# Patient Record
Sex: MALE | Employment: OTHER | URBAN - METROPOLITAN AREA
[De-identification: names, ages, dates, MRNs, and addresses within clinical notes are randomized per-mention and may not be internally consistent; named-entity substitution may affect disease eponyms.]

---

## 2017-04-15 ENCOUNTER — HOSPITAL ENCOUNTER (INPATIENT)
Age: 82
LOS: 5 days | Discharge: HOSPICE | DRG: 436 | End: 2017-04-20
Attending: HOSPITALIST | Admitting: INTERNAL MEDICINE
Payer: MEDICARE

## 2017-04-15 ENCOUNTER — APPOINTMENT (OUTPATIENT)
Dept: CT IMAGING | Age: 82
DRG: 436 | End: 2017-04-15
Attending: INTERNAL MEDICINE
Payer: MEDICARE

## 2017-04-15 DIAGNOSIS — C78.7 METASTATIC ADENOCARCINOMA TO LIVER (HCC): ICD-10-CM

## 2017-04-15 DIAGNOSIS — R16.0 LIVER MASSES: ICD-10-CM

## 2017-04-15 PROBLEM — O22.30 DVT (DEEP VEIN THROMBOSIS) IN PREGNANCY: Chronic | Status: ACTIVE | Noted: 2017-04-15

## 2017-04-15 PROBLEM — I26.99 PULMONARY EMBOLISM (HCC): Chronic | Status: ACTIVE | Noted: 2017-04-15

## 2017-04-15 PROBLEM — I10 HTN (HYPERTENSION): Chronic | Status: ACTIVE | Noted: 2017-04-15

## 2017-04-15 PROBLEM — N40.0 BPH (BENIGN PROSTATIC HYPERPLASIA): Chronic | Status: ACTIVE | Noted: 2017-04-15

## 2017-04-15 PROBLEM — I48.91 A-FIB (HCC): Chronic | Status: ACTIVE | Noted: 2017-04-15

## 2017-04-15 PROBLEM — C80.1 CANCER (HCC): Status: ACTIVE | Noted: 2017-04-15

## 2017-04-15 LAB
ALBUMIN SERPL BCP-MCNC: 2.4 G/DL (ref 3.5–5)
ALBUMIN/GLOB SERPL: 0.7 {RATIO} (ref 1.1–2.2)
ALP SERPL-CCNC: 696 U/L (ref 45–117)
ALT SERPL-CCNC: 40 U/L (ref 12–78)
ANION GAP BLD CALC-SCNC: 7 MMOL/L (ref 5–15)
AST SERPL W P-5'-P-CCNC: 77 U/L (ref 15–37)
BASOPHILS # BLD AUTO: 0 K/UL (ref 0–0.1)
BASOPHILS # BLD: 0 % (ref 0–1)
BILIRUB SERPL-MCNC: 0.4 MG/DL (ref 0.2–1)
BUN SERPL-MCNC: 44 MG/DL (ref 6–20)
BUN/CREAT SERPL: 36 (ref 12–20)
CALCIUM SERPL-MCNC: 9.2 MG/DL (ref 8.5–10.1)
CHLORIDE SERPL-SCNC: 104 MMOL/L (ref 97–108)
CO2 SERPL-SCNC: 24 MMOL/L (ref 21–32)
CREAT SERPL-MCNC: 1.23 MG/DL (ref 0.7–1.3)
EOSINOPHIL # BLD: 0.2 K/UL (ref 0–0.4)
EOSINOPHIL NFR BLD: 1 % (ref 0–7)
ERYTHROCYTE [DISTWIDTH] IN BLOOD BY AUTOMATED COUNT: 17.6 % (ref 11.5–14.5)
GLOBULIN SER CALC-MCNC: 3.3 G/DL (ref 2–4)
GLUCOSE SERPL-MCNC: 112 MG/DL (ref 65–100)
HCT VFR BLD AUTO: 28.1 % (ref 36.6–50.3)
HGB BLD-MCNC: 8.8 G/DL (ref 12.1–17)
INR PPP: 1.9 (ref 0.9–1.1)
LYMPHOCYTES # BLD AUTO: 12 % (ref 12–49)
LYMPHOCYTES # BLD: 2.3 K/UL (ref 0.8–3.5)
MCH RBC QN AUTO: 23.8 PG (ref 26–34)
MCHC RBC AUTO-ENTMCNC: 31.3 G/DL (ref 30–36.5)
MCV RBC AUTO: 75.9 FL (ref 80–99)
MONOCYTES # BLD: 0.6 K/UL (ref 0–1)
MONOCYTES NFR BLD AUTO: 3 % (ref 5–13)
NEUTS SEG # BLD: 16.6 K/UL (ref 1.8–8)
NEUTS SEG NFR BLD AUTO: 84 % (ref 32–75)
PLATELET # BLD AUTO: 451 K/UL (ref 150–400)
POTASSIUM SERPL-SCNC: 5.2 MMOL/L (ref 3.5–5.1)
PROT SERPL-MCNC: 5.7 G/DL (ref 6.4–8.2)
PROTHROMBIN TIME: 19.4 SEC (ref 9–11.1)
RBC # BLD AUTO: 3.7 M/UL (ref 4.1–5.7)
SODIUM SERPL-SCNC: 135 MMOL/L (ref 136–145)
WBC # BLD AUTO: 19.8 K/UL (ref 4.1–11.1)

## 2017-04-15 PROCEDURE — 65660000000 HC RM CCU STEPDOWN

## 2017-04-15 PROCEDURE — 80053 COMPREHEN METABOLIC PANEL: CPT | Performed by: INTERNAL MEDICINE

## 2017-04-15 PROCEDURE — 0T9B70Z DRAINAGE OF BLADDER WITH DRAINAGE DEVICE, VIA NATURAL OR ARTIFICIAL OPENING: ICD-10-PCS | Performed by: HOSPITALIST

## 2017-04-15 PROCEDURE — 71260 CT THORAX DX C+: CPT

## 2017-04-15 PROCEDURE — 93005 ELECTROCARDIOGRAM TRACING: CPT

## 2017-04-15 PROCEDURE — 36415 COLL VENOUS BLD VENIPUNCTURE: CPT | Performed by: INTERNAL MEDICINE

## 2017-04-15 PROCEDURE — 85610 PROTHROMBIN TIME: CPT | Performed by: INTERNAL MEDICINE

## 2017-04-15 PROCEDURE — 74011250636 HC RX REV CODE- 250/636: Performed by: INTERNAL MEDICINE

## 2017-04-15 PROCEDURE — 74011250637 HC RX REV CODE- 250/637: Performed by: INTERNAL MEDICINE

## 2017-04-15 PROCEDURE — 85025 COMPLETE CBC W/AUTO DIFF WBC: CPT | Performed by: INTERNAL MEDICINE

## 2017-04-15 RX ORDER — ACETAMINOPHEN 325 MG/1
650 TABLET ORAL
Status: DISCONTINUED | OUTPATIENT
Start: 2017-04-15 | End: 2017-04-20 | Stop reason: HOSPADM

## 2017-04-15 RX ORDER — IPRATROPIUM BROMIDE AND ALBUTEROL SULFATE 2.5; .5 MG/3ML; MG/3ML
3 SOLUTION RESPIRATORY (INHALATION)
COMMUNITY

## 2017-04-15 RX ORDER — OMEPRAZOLE 20 MG/1
20 CAPSULE, DELAYED RELEASE ORAL DAILY
Status: DISCONTINUED | OUTPATIENT
Start: 2017-04-16 | End: 2017-04-15 | Stop reason: CLARIF

## 2017-04-15 RX ORDER — LANOLIN ALCOHOL/MO/W.PET/CERES
325 CREAM (GRAM) TOPICAL
COMMUNITY

## 2017-04-15 RX ORDER — DOXAZOSIN 2 MG/1
4 TABLET ORAL DAILY
Status: DISCONTINUED | OUTPATIENT
Start: 2017-04-16 | End: 2017-04-20 | Stop reason: HOSPADM

## 2017-04-15 RX ORDER — SODIUM CHLORIDE 0.9 % (FLUSH) 0.9 %
5-10 SYRINGE (ML) INJECTION AS NEEDED
Status: DISCONTINUED | OUTPATIENT
Start: 2017-04-15 | End: 2017-04-20 | Stop reason: HOSPADM

## 2017-04-15 RX ORDER — METHOTREXATE 2.5 MG/1
10 TABLET ORAL
Status: DISCONTINUED | OUTPATIENT
Start: 2017-04-17 | End: 2017-04-20 | Stop reason: HOSPADM

## 2017-04-15 RX ORDER — DOXAZOSIN 4 MG/1
4 TABLET ORAL DAILY
COMMUNITY

## 2017-04-15 RX ORDER — METOPROLOL TARTRATE 5 MG/5ML
5 INJECTION INTRAVENOUS
Status: DISCONTINUED | OUTPATIENT
Start: 2017-04-15 | End: 2017-04-20 | Stop reason: HOSPADM

## 2017-04-15 RX ORDER — METOPROLOL TARTRATE 50 MG/1
50 TABLET ORAL 2 TIMES DAILY
Status: DISCONTINUED | OUTPATIENT
Start: 2017-04-15 | End: 2017-04-20 | Stop reason: HOSPADM

## 2017-04-15 RX ORDER — MELATONIN
1000 DAILY
COMMUNITY

## 2017-04-15 RX ORDER — BUDESONIDE 0.5 MG/2ML
500 INHALANT ORAL
Status: DISCONTINUED | OUTPATIENT
Start: 2017-04-15 | End: 2017-04-20 | Stop reason: HOSPADM

## 2017-04-15 RX ORDER — FINASTERIDE 5 MG/1
5 TABLET, FILM COATED ORAL DAILY
COMMUNITY

## 2017-04-15 RX ORDER — GUAIFENESIN 600 MG/1
600 TABLET, EXTENDED RELEASE ORAL 2 TIMES DAILY
Status: DISCONTINUED | OUTPATIENT
Start: 2017-04-15 | End: 2017-04-20 | Stop reason: HOSPADM

## 2017-04-15 RX ORDER — OMEPRAZOLE 20 MG/1
20 CAPSULE, DELAYED RELEASE ORAL DAILY
COMMUNITY

## 2017-04-15 RX ORDER — SODIUM CHLORIDE 0.9 % (FLUSH) 0.9 %
5-10 SYRINGE (ML) INJECTION EVERY 8 HOURS
Status: DISCONTINUED | OUTPATIENT
Start: 2017-04-15 | End: 2017-04-20 | Stop reason: HOSPADM

## 2017-04-15 RX ORDER — FOLIC ACID 1 MG/1
1 TABLET ORAL DAILY
COMMUNITY

## 2017-04-15 RX ORDER — FINASTERIDE 5 MG/1
5 TABLET, FILM COATED ORAL DAILY
Status: DISCONTINUED | OUTPATIENT
Start: 2017-04-16 | End: 2017-04-20 | Stop reason: HOSPADM

## 2017-04-15 RX ORDER — LANOLIN ALCOHOL/MO/W.PET/CERES
325 CREAM (GRAM) TOPICAL
Status: DISCONTINUED | OUTPATIENT
Start: 2017-04-16 | End: 2017-04-20 | Stop reason: HOSPADM

## 2017-04-15 RX ORDER — FLUTICASONE PROPIONATE AND SALMETEROL 500; 50 UG/1; UG/1
1 POWDER RESPIRATORY (INHALATION) EVERY 12 HOURS
COMMUNITY

## 2017-04-15 RX ORDER — ONDANSETRON 2 MG/ML
4 INJECTION INTRAMUSCULAR; INTRAVENOUS
Status: DISCONTINUED | OUTPATIENT
Start: 2017-04-15 | End: 2017-04-20 | Stop reason: HOSPADM

## 2017-04-15 RX ORDER — METOPROLOL TARTRATE 50 MG/1
50 TABLET ORAL 2 TIMES DAILY
COMMUNITY

## 2017-04-15 RX ORDER — FOLIC ACID 1 MG/1
1 TABLET ORAL DAILY
Status: DISCONTINUED | OUTPATIENT
Start: 2017-04-16 | End: 2017-04-20 | Stop reason: HOSPADM

## 2017-04-15 RX ORDER — PANTOPRAZOLE SODIUM 40 MG/1
40 TABLET, DELAYED RELEASE ORAL
Status: DISCONTINUED | OUTPATIENT
Start: 2017-04-16 | End: 2017-04-20 | Stop reason: HOSPADM

## 2017-04-15 RX ORDER — SPIRONOLACTONE 100 MG/1
100 TABLET, FILM COATED ORAL DAILY
COMMUNITY

## 2017-04-15 RX ORDER — LANOLIN ALCOHOL/MO/W.PET/CERES
400 CREAM (GRAM) TOPICAL DAILY
Status: ON HOLD | COMMUNITY
End: 2017-04-15

## 2017-04-15 RX ORDER — LOSARTAN POTASSIUM 50 MG/1
50 TABLET ORAL DAILY
COMMUNITY

## 2017-04-15 RX ORDER — ALBUTEROL SULFATE 0.83 MG/ML
2.5 SOLUTION RESPIRATORY (INHALATION)
Status: DISCONTINUED | OUTPATIENT
Start: 2017-04-15 | End: 2017-04-17 | Stop reason: SDUPTHER

## 2017-04-15 RX ORDER — MELATONIN
1000 DAILY
Status: DISCONTINUED | OUTPATIENT
Start: 2017-04-16 | End: 2017-04-20 | Stop reason: HOSPADM

## 2017-04-15 RX ORDER — SODIUM CHLORIDE 9 MG/ML
75 INJECTION, SOLUTION INTRAVENOUS CONTINUOUS
Status: DISCONTINUED | OUTPATIENT
Start: 2017-04-15 | End: 2017-04-20 | Stop reason: HOSPADM

## 2017-04-15 RX ORDER — LOSARTAN POTASSIUM 50 MG/1
50 TABLET ORAL DAILY
Status: DISCONTINUED | OUTPATIENT
Start: 2017-04-16 | End: 2017-04-16

## 2017-04-15 RX ORDER — FLUTICASONE PROPIONATE AND SALMETEROL 500; 50 UG/1; UG/1
1 POWDER RESPIRATORY (INHALATION) EVERY 12 HOURS
Status: DISCONTINUED | OUTPATIENT
Start: 2017-04-15 | End: 2017-04-15 | Stop reason: CLARIF

## 2017-04-15 RX ORDER — HYDROCHLOROTHIAZIDE 25 MG/1
25 TABLET ORAL DAILY
COMMUNITY

## 2017-04-15 RX ORDER — ARFORMOTEROL TARTRATE 15 UG/2ML
15 SOLUTION RESPIRATORY (INHALATION)
Status: DISCONTINUED | OUTPATIENT
Start: 2017-04-15 | End: 2017-04-20 | Stop reason: HOSPADM

## 2017-04-15 RX ORDER — METHOTREXATE 2.5 MG/1
10 TABLET ORAL
COMMUNITY

## 2017-04-15 RX ADMIN — Medication 10 ML: at 18:31

## 2017-04-15 RX ADMIN — GUAIFENESIN 600 MG: 600 TABLET, EXTENDED RELEASE ORAL at 20:01

## 2017-04-15 RX ADMIN — Medication 10 ML: at 22:09

## 2017-04-15 RX ADMIN — SODIUM CHLORIDE 75 ML/HR: 900 INJECTION, SOLUTION INTRAVENOUS at 18:31

## 2017-04-15 NOTE — PROGRESS NOTES
Admission Medication Reconciliation:    Information obtained from: medical record from [address] [place] [address]    Significant PMH/Disease States:   No past medical history on file. Chief Complaint for this Admission:  cancer    Allergies:  Review of patient's allergies indicates no known allergies. Prior to Admission Medications:   Prior to Admission Medications   Prescriptions Last Dose Informant Patient Reported? Taking? albuterol-ipratropium (DUONEB) 2.5 mg-0.5 mg/3 ml nebu Unknown at Unknown time  Yes No   Sig: 3 mL by Nebulization route every four (4) hours as needed. cholecalciferol (VITAMIN D3) 1,000 unit tablet 4/15/2017 at Unknown time  Yes Yes   Sig: Take 1,000 Units by mouth daily. doxazosin (CARDURA) 4 mg tablet 4/15/2017 at Unknown time  Yes Yes   Sig: Take 4 mg by mouth daily. ferrous sulfate 325 mg (65 mg iron) tablet 4/15/2017 at Unknown time  Yes Yes   Sig: Take 325 mg by mouth Daily (before breakfast). finasteride (PROSCAR) 5 mg tablet 4/15/2017 at Unknown time  Yes Yes   Sig: Take 5 mg by mouth daily. fish oil-omega-3 fatty acids 340-1,000 mg capsule 4/15/2017 at Unknown time  Yes Yes   Sig: Take 3 Caps by mouth daily. fluticasone-salmeterol (ADVAIR DISKUS) 500-50 mcg/dose diskus inhaler 4/15/2017 at Unknown time  Yes Yes   Sig: Take 1 Puff by inhalation every twelve (12) hours. folic acid (FOLVITE) 1 mg tablet 4/15/2017 at Unknown time  Yes Yes   Sig: Take 1 mg by mouth daily. guaiFENesin SR (MUCINEX) 600 mg SR tablet 4/15/2017 at Unknown time  Yes Yes   Sig: Take 600 mg by mouth two (2) times a day. hydroCHLOROthiazide (HYDRODIURIL) 25 mg tablet 4/15/2017 at Unknown time  Yes Yes   Sig: Take 25 mg by mouth daily. losartan (COZAAR) 50 mg tablet 4/15/2017 at Unknown time  Yes Yes   Sig: Take 50 mg by mouth daily. methotrexate (RHEUMATREX) 2.5 mg tablet 4/15/2017 at Unknown time  Yes Yes   Sig: Take 10 mg by mouth every Monday.    metoprolol tartrate (LOPRESSOR) 50 mg tablet 4/15/2017 at Unknown time  Yes Yes   Sig: Take 50 mg by mouth two (2) times a day. multivitamin, tx-iron-ca-min (THERA-M W/ IRON) 9 mg iron-400 mcg tab tablet 4/15/2017 at Unknown time  Yes Yes   Sig: Take 1 Tab by mouth daily. omeprazole (PRILOSEC) 20 mg capsule 4/15/2017 at Unknown time  Yes Yes   Sig: Take 20 mg by mouth daily. rivaroxaban (XARELTO) 20 mg tab tablet 4/15/2017 at Unknown time  Yes Yes   Sig: Take 20 mg by mouth daily (with dinner). spironolactone (ALDACTONE) 100 mg tablet 4/15/2017 at Unknown time  Yes Yes   Sig: Take 100 mg by mouth daily. Facility-Administered Medications: None         Comments/Recommendations: Medication review done with medical record from [address] [place] [address] dated 4/15/2017 and with RX query to verify dose of Advair. Regimen verified by RN with patient.   Added all present medications to pta med list.

## 2017-04-15 NOTE — H&P
History & Physical    Primary Care Provider: Ford Rousseau MD  Source of Information: Patient and daughter    History of Presenting Illness:   Milena Álvarez is a 80 y.o. male with medical history significant for DVT/PE, RA, HTN, BPH and BLE edema who presents in transfer from [place] due to abnormal findings on a CT abdomen/pelvis. Patient states that for the past 5-6 weeks, he has been having abdominal distension and dyspnea. No associated nausea or abdominal pain. Positive for emesis x 1 recently. Having normal bowel movements. Positive for significant unintentional weight loss. Regarding dyspnea, positive with activity and with excessive speech. No productive cough or chest pain. Negative additionally for fever, chills, congestion, recent illness, palpitations, diarrhea , headaches, visual disturbances, ataxia, or dysuria. Due to symptoms he presented to [facility]. Vitals and labs wnl. CT of the abdomen done due to significant distention and positive findings of numerous large solid masses scattered throughout the liver suspicious for hepatic metastatic disease. Transferred for additional workup and management. 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 RA on methotrexate  3. HTN  4. BPH  5. [facility] post flu shot  6. Prior tobacco use--quit 1969  7. BLE edema on spironolactone and HCTZ  8. Afib on xarelto    No past surgical history on file. Prior to Admission medications    Medication Sig Start Date End Date Taking? Authorizing Provider   multivitamin, tx-iron-ca-min (THERA-M W/ IRON) 9 mg iron-400 mcg tab tablet Take 1 Tab by mouth daily. Yes Historical Provider   doxazosin (CARDURA) 4 mg tablet Take 4 mg by mouth daily.    Yes Historical Provider   finasteride (PROSCAR) 5 mg tablet Take 5 mg by mouth daily. Yes Historical Provider   fluticasone-salmeterol (ADVAIR DISKUS) 500-50 mcg/dose diskus inhaler Take 1 Puff by inhalation every twelve (12) hours. Yes Historical Provider   guaiFENesin SR (MUCINEX) 600 mg SR tablet Take 600 mg by mouth two (2) times a day. Yes Historical Provider   hydroCHLOROthiazide (HYDRODIURIL) 25 mg tablet Take 25 mg by mouth daily. Yes Historical Provider   ferrous sulfate 325 mg (65 mg iron) tablet Take 325 mg by mouth Daily (before breakfast). Yes Historical Provider   losartan (COZAAR) 50 mg tablet Take 50 mg by mouth daily. Yes Historical Provider   methotrexate (RHEUMATREX) 2.5 mg tablet Take 10 mg by mouth every Monday. Yes Historical Provider   metoprolol tartrate (LOPRESSOR) 50 mg tablet Take 50 mg by mouth two (2) times a day. Yes Historical Provider   fish oil-omega-3 fatty acids 340-1,000 mg capsule Take 3 Caps by mouth daily. Yes Historical Provider   omeprazole (PRILOSEC) 20 mg capsule Take 20 mg by mouth daily. Yes Historical Provider   folic acid (FOLVITE) 1 mg tablet Take 1 mg by mouth daily. Yes Historical Provider   spironolactone (ALDACTONE) 100 mg tablet Take 100 mg by mouth daily. Yes Historical Provider   cholecalciferol (VITAMIN D3) 1,000 unit tablet Take 1,000 Units by mouth daily. Yes Historical Provider   rivaroxaban (XARELTO) 20 mg tab tablet Take 20 mg by mouth daily (with dinner). Yes Historical Provider   albuterol-ipratropium (DUONEB) 2.5 mg-0.5 mg/3 ml nebu 3 mL by Nebulization route every four (4) hours as needed.     Historical Provider     Allergies:  NKDA    FHX:  Mother-- of cancer--possible stomach/GI  Father-- of lung cancer  Brother-- of GI cancer--possible intestinal     SOCIAL HISTORY:  Patient resides:  Independently x   Assisted Living    SNF    With family care       Smoking history:   None    Former x   Chronic      Alcohol history: Rare None    Social    Chronic      Ambulates:   Independently x   w/cane    w/walker    w/wc    CODE STATUS:  DNR x   Full    Other      Objective:     Physical Exam:     Visit Vitals    BP 96/53 (BP 1 Location: Right arm, BP Patient Position: At rest;Supine)    Pulse 98    Temp 97.9 °F (36.6 °C)    Resp 22    Wt 77.9 kg (171 lb 11.8 oz)    SpO2 98%      O2 Device: Room air    General:  Alert, cooperative, no distress, appears stated age. Head:  Normocephalic, without obvious abnormality, atraumatic. Eyes:  Conjunctivae/corneas clear. EOMs intact. Nose: Nares normal. Septum midline. Throat: Lips, mucosa, and tongue normal. .   Neck: Supple, symmetrical, trachea midline, no JVD. Lungs:   Clear to auscultation bilaterally. No w/r/r. Heart:  Irregularly irregular, S1, S2 normal, positive BLE 1+ pitting edema to the thighs   Abdomen:   Soft, non-tender. Distended. Bowel sounds normal. Positive hepatomegaly. Extremities: Extremities atraumatic and without cyanosis. RLE with healing ulcer on shin. Pulses: 2+ and symmetric BUE   Skin: Skin color, texture, turgor normal. No rashes   Neurologic: CNII-XII intact. EKG:  pending      Data Review:     Recent Days:  No results for input(s): WBC, HGB, HCT, PLT, HGBEXT, HCTEXT, PLTEXT in the last 72 hours. No results for input(s): NA, K, CL, CO2, GLU, BUN, CREA, CA, MG, PHOS, ALB, TBIL, SGOT, ALT, INR in the last 72 hours. No lab exists for component: INREXT  No results for input(s): PH, PCO2, PO2, HCO3, FIO2 in the last 72 hours. 24 Hour Results:  No results found for this or any previous visit (from the past 24 hour(s)). Imaging:  [facility]:  Numerous large solid masses scattered throughout the liver most suspicious for hepatic metastatic disease. Extensive soft tissue nodularity throughout the omentum and anterior peritoneal surface most compatible with extensive peritoneal carcinomatosis.   Masslike complex fluid collection/rind surrounding the stomach results in external compression concerning for further peritoneal carcinomatosis, potentially of mucinous origin. Assessment:     Active Problems:    Cancer ([facility] [address] (4/15/2017)           Plan:     79 y/o male with:  1. Presumed metastatic cancer, unknown primary  --CT abdomen/pelvis as above  --Possible biliary tumor primary? --Will obtain CT chest   --Hold on CT/MRI brain for now  --Hold on oncology consult until tissue obtained  --IR consult for biopsy Monday   --Of note, patient on xarelto 20 mg daily for afib-->will hold in order to have biopsy-->risk of holding discussed with patient and daughter. Both understand. 2. Afib  --Continue home metoprolol   --Metoprolol 5 mg IV Q6 hours PRN HR > 120    3. DVT/PE  --Last in [facility] [address] duplex--if DVT noted, will need lovenox/heparin bridge while holding xarelto for biopsy    4. BPH  --Continue home   --Finasteride and cardura as tolerated  --Teague placed for urinary retention of 1 L    5. RA  --Continue methotrexate    6. HTN  --Continue home medications with parameters  --Hypotensive on admission tonight but asymptomatic-->start IVF and hold spironolactone and HCTZ    7.  GERD  --Continue PPI    DVT prophylaxis: STEPHANIE hose and heparin SQ--await results of duplex  Code Status: DNR/I         Signed By: Goyo Rodriguez, DO     April 15, 2017

## 2017-04-15 NOTE — PROGRESS NOTES
04/15/17 1900   Vital Signs   Temp 98 °F (36.7 °C)   Temp Source Oral   Pulse (Heart Rate) 97   Heart Rate Source Monitor   Cardiac Rhythm A Fib   Resp Rate 21   O2 Sat (%) 97 %   Level of Consciousness Alert   BP 90/45   MAP (Calculated) (!) 60   BP 1 Method Automatic   BP 1 Location Right arm   BP Patient Position At rest   MEWS Score 3     MD aware; ekg and chest CT ordered; orders for metoprolol modified     Will continue to monitor pt

## 2017-04-15 NOTE — PROGRESS NOTES
Bedside shift change report given to ROME Devine (oncoming nurse) by Jaycob Leija (offgoing nurse).  Report included the following information SBAR, Kardex, Intake/Output, MAR, Recent Results and Cardiac Rhythm A Fib.   '

## 2017-04-15 NOTE — IP AVS SNAPSHOT
Current Discharge Medication List  
  
CONTINUE these medications which have NOT CHANGED Dose & Instructions Dispensing Information Comments Morning Noon Evening Bedtime ADVAIR DISKUS 500-50 mcg/dose diskus inhaler Generic drug:  fluticasone-salmeterol Your last dose was: Your next dose is:    
   
   
 Dose:  1 Puff Take 1 Puff by inhalation every twelve (12) hours. Refills:  0  
     
   
   
   
  
 CARDURA 4 mg tablet Generic drug:  doxazosin Your last dose was: Your next dose is:    
   
   
 Dose:  4 mg Take 4 mg by mouth daily. Refills:  0 DUONEB 2.5 mg-0.5 mg/3 ml Nebu Generic drug:  albuterol-ipratropium Your last dose was: Your next dose is:    
   
   
 Dose:  3 mL  
3 mL by Nebulization route every four (4) hours as needed. Refills:  0  
     
   
   
   
  
 ferrous sulfate 325 mg (65 mg iron) tablet Your last dose was: Your next dose is:    
   
   
 Dose:  325 mg Take 325 mg by mouth Daily (before breakfast). Refills:  0  
     
   
   
   
  
 finasteride 5 mg tablet Commonly known as:  PROSCAR Your last dose was: Your next dose is:    
   
   
 Dose:  5 mg Take 5 mg by mouth daily. Refills:  0  
     
   
   
   
  
 fish oil-omega-3 fatty acids 340-1,000 mg capsule Your last dose was: Your next dose is:    
   
   
 Dose:  3 Cap Take 3 Caps by mouth daily. Refills:  0  
     
   
   
   
  
 folic acid 1 mg tablet Commonly known as:  Delores Your last dose was: Your next dose is:    
   
   
 Dose:  1 mg Take 1 mg by mouth daily. Refills:  0  
     
   
   
   
  
 guaiFENesin  mg SR tablet Commonly known as:  Mingle[employer] & Alex Your last dose was: Your next dose is:    
   
   
 Dose:  600 mg Take 600 mg by mouth two (2) times a day. Refills:  0 hydroCHLOROthiazide 25 mg tablet Commonly known as:  HYDRODIURIL Your last dose was: Your next dose is:    
   
   
 Dose:  25 mg Take 25 mg by mouth daily. Refills:  0  
     
   
   
   
  
 losartan 50 mg tablet Commonly known as:  COZAAR Your last dose was: Your next dose is:    
   
   
 Dose:  50 mg Take 50 mg by mouth daily. Refills:  0  
     
   
   
   
  
 methotrexate 2.5 mg tablet Commonly known as:  [place] Your last dose was: Your next dose is:    
   
   
 Dose:  10 mg Take 10 mg by mouth every Monday. Refills:  0  
     
   
   
   
  
 metoprolol tartrate 50 mg tablet Commonly known as:  LOPRESSOR Your last dose was: Your next dose is:    
   
   
 Dose:  50 mg Take 50 mg by mouth two (2) times a day. Refills:  0  
     
   
   
   
  
 multivitamin, tx-iron-ca-min 9 mg iron-400 mcg Tab tablet Commonly known as:  THERA-M w/ IRON Your last dose was: Your next dose is:    
   
   
 Dose:  1 Tab Take 1 Tab by mouth daily. Refills:  0  
     
   
   
   
  
 omeprazole 20 mg capsule Commonly known as:  PRILOSEC Your last dose was: Your next dose is:    
   
   
 Dose:  20 mg Take 20 mg by mouth daily. Refills:  0  
     
   
   
   
  
 rivaroxaban 20 mg Tab tablet Commonly known as:  Babita Chroman Your last dose was: Your next dose is:    
   
   
 Dose:  20 mg Take 20 mg by mouth daily (with dinner). Refills:  0  
     
   
   
   
  
 spironolactone 100 mg tablet Commonly known as:  ALDACTONE Your last dose was: Your next dose is:    
   
   
 Dose:  100 mg Take 100 mg by mouth daily. Refills:  0  
     
   
   
   
  
 VITAMIN D3 1,000 unit tablet Generic drug:  cholecalciferol Your last dose was: Your next dose is:    
   
   
 Dose:  1000 Units Take 1,000 Units by mouth daily. Refills:  0

## 2017-04-15 NOTE — IP AVS SNAPSHOT
[address] 
[phone] Patient: Silvana Luke MRN: [id number] You are allergic to the following No active allergies Recent Documentation Height Weight BMI  
  
  
 1.651 m 82.7 kg 30.34 kg/m2 Emergency Contacts Name Discharge Info Relation Home Work Mobile Claudia Nobles DISCHARGE CAREGIVER [3] Child [2] [phone] Bonnie Meade  Daughter [21]   [phone] About your hospitalization You were admitted on:  April 15, 2017 You last received care in the:  [facility] 6S NEURO-SCI TELE You were discharged on:  2017 Unit phone number:  [phone] Why you were hospitalized Your primary diagnosis was:  Cancer (Hcc) Your diagnoses also included:  Dvt (Deep Vein Thrombosis) In Pregnancy (Hcc), A-Fib (Hcc), Htn (Hypertension), Pulmonary Embolism (Hcc), Bph (Benign Prostatic Hyperplasia) Providers Seen During Your Hospitalizations Provider Role Specialty Primary office phone Oriana Morales MD Attending Provider Internal Medicine [phone] Julia Painting MD Attending Provider Internal Medicine [phone] Your Primary Care Physician (PCP) Primary Care Physician Office Phone Office Fax [facility] [phone] Follow-up Information Follow up With Details Comments Contact Info Suha Tom, [address] [facility] [address] 
[phone] 
  
 hospice services [facility] at home. [phone] Current Discharge Medication List  
  
CONTINUE these medications which have NOT CHANGED Dose & Instructions Dispensing Information Comments Morning Noon Evening Bedtime ADVAIR DISKUS 500-50 mcg/dose diskus inhaler Generic drug:  fluticasone-salmeterol Your last dose was: Your next dose is:    
   
   
 Dose:  1 Puff Take 1 Puff by inhalation every twelve (12) hours. Refills:  0  
     
   
   
   
  
 CARDURA 4 mg tablet Generic drug:  doxazosin Your last dose was: Your next dose is:    
   
   
 Dose:  4 mg Take 4 mg by mouth daily. Refills:  0 DUONEB 2.5 mg-0.5 mg/3 ml Nebu Generic drug:  albuterol-ipratropium Your last dose was: Your next dose is:    
   
   
 Dose:  3 mL  
3 mL by Nebulization route every four (4) hours as needed. Refills:  0  
     
   
   
   
  
 ferrous sulfate 325 mg (65 mg iron) tablet Your last dose was: Your next dose is:    
   
   
 Dose:  325 mg Take 325 mg by mouth Daily (before breakfast). Refills:  0  
     
   
   
   
  
 finasteride 5 mg tablet Commonly known as:  PROSCAR Your last dose was: Your next dose is:    
   
   
 Dose:  5 mg Take 5 mg by mouth daily. Refills:  0  
     
   
   
   
  
 fish oil-omega-3 fatty acids 340-1,000 mg capsule Your last dose was: Your next dose is:    
   
   
 Dose:  3 Cap Take 3 Caps by mouth daily. Refills:  0  
     
   
   
   
  
 folic acid 1 mg tablet Commonly known as:  [employer] Your last dose was: Your next dose is:    
   
   
 Dose:  1 mg Take 1 mg by mouth daily. Refills:  0  
     
   
   
   
  
 guaiFENesin  mg SR tablet Commonly known as:  Alex & Alex Your last dose was: Your next dose is:    
   
   
 Dose:  600 mg Take 600 mg by mouth two (2) times a day. Refills:  0  
     
   
   
   
  
 hydroCHLOROthiazide 25 mg tablet Commonly known as:  HYDRODIURIL Your last dose was: Your next dose is:    
   
   
 Dose:  25 mg Take 25 mg by mouth daily. Refills:  0  
     
   
   
   
  
 losartan 50 mg tablet Commonly known as:  COZAAR Your last dose was: Your next dose is:    
   
   
 Dose:  50 mg Take 50 mg by mouth daily. Refills:  0  
     
   
   
   
  
 methotrexate 2.5 mg tablet Commonly known as:  Cora Iron Your last dose was: Your next dose is:    
   
   
 Dose:  10 mg Take 10 mg by mouth every Monday. Refills:  0  
     
   
   
   
  
 metoprolol tartrate 50 mg tablet Commonly known as:  LOPRESSOR Your last dose was: Your next dose is:    
   
   
 Dose:  50 mg Take 50 mg by mouth two (2) times a day. Refills:  0  
     
   
   
   
  
 multivitamin, tx-iron-ca-min 9 mg iron-400 mcg Tab tablet Commonly known as:  THERA-M w/ IRON Your last dose was: Your next dose is:    
   
   
 Dose:  1 Tab Take 1 Tab by mouth daily. Refills:  0  
     
   
   
   
  
 omeprazole 20 mg capsule Commonly known as:  PRILOSEC Your last dose was: Your next dose is:    
   
   
 Dose:  20 mg Take 20 mg by mouth daily. Refills:  0  
     
   
   
   
  
 rivaroxaban 20 mg Tab tablet Commonly known as:  Rachnaaracely Haro Your last dose was: Your next dose is:    
   
   
 Dose:  20 mg Take 20 mg by mouth daily (with dinner). Refills:  0  
     
   
   
   
  
 spironolactone 100 mg tablet Commonly known as:  ALDACTONE Your last dose was: Your next dose is:    
   
   
 Dose:  100 mg Take 100 mg by mouth daily. Refills:  0  
     
   
   
   
  
 VITAMIN D3 1,000 unit tablet Generic drug:  cholecalciferol Your last dose was: Your next dose is:    
   
   
 Dose:  1000 Units Take 1,000 Units by mouth daily. Refills:  0 Discharge Instructions Discharge Instructions PATIENT ID: Dong Mancera MRN: [id number] YOB: 1929 DATE OF ADMISSION: 4/15/2017  3:59 PM   
DATE OF DISCHARGE: 4/20/2017 PRIMARY CARE PROVIDER: Aj David MD  
 
ATTENDING PHYSICIAN: Yamile Leggett MD 
DISCHARGING PROVIDER: Yamile Leggett MD   
 To contact this individual call [phone] and ask the  to page. If unavailable ask to be transferred the Adult Hospitalist Department. DISCHARGE DIAGNOSES Metastatic adenocarcinoma, Urinary retention CONSULTATIONS: IP CONSULT TO INTERVENTIONAL RADIOLOGY 
IP CONSULT TO ONCOLOGY PROCEDURES/SURGERIES: * No surgery found * PENDING TEST RESULTS:  
At the time of discharge the following test results are still pending: none FOLLOW UP APPOINTMENTS:  
Follow-up Information Follow up With Details Comments Contact Jacinta Earlean Epley, [address] [facility] 
[place]os [place]  
[phone] 
  
 hospice services ADDITIONAL CARE RECOMMENDATIONS:  
 
· Since you failed to void after the goff catheter was removed yesterday,it was reinserted. The goff catheter can be changed every 30 days. If the retention does not [facility] consult with urology. · If your breathing gets worse you become more short of breath,talk to primary doctor or hospice team to check ultrasound to check for ascites(excessive fluid in the abdomen) which can be drained and would help with the breathing. DIET: Cardiac Diet ACTIVITY: Activity as tolerated WOUND CARE: NA 
 
EQUIPMENT needed: NA 
 
 
 
 
DISPOSITION: 
 x  Home With: 
 OT  PT  [facility]  RN  
  
 SNF/Inpatient Rehab/LTAC Independent/assisted living Hospice Other:  
 
 
 
Signed: Kayla Palacio MD 
4/20/2017 11:44 AM 
 
Discharge Orders None Fromography Announcement We are excited to announce that we are making your provider's discharge notes available to you in Fromography. You will see these notes when they are completed and signed by the physician that discharged you from your recent hospital stay. If you have any questions or concerns about any information you see in Fromography, please call the Health Information Department where you were seen or reach out to your Primary Care Provider for more information about your plan of care. Introducing [facility]! [place] introduces Fromography patient portal. Now you can access parts of your medical record, email your doctor's office, and request medication refills online. 1. In your internet browser, go to [email or web address] 2. Click on the First Time User? Click Here link in the Sign In box. You will see the New Member Sign Up page. 3. Enter your Fromography Access Code exactly as it appears below. You will not need to use this code after youve completed the sign-up process. If you do not sign up before the expiration date, you must request a new code. · Fromography Access Code: [email or web address] Expires: 7/14/2017 12:32 PM 
 
4. Enter the last four digits of your Social Security Number (xxxx) and Date of Birth (mm/dd/yyyy) as indicated and click Submit. You will be taken to the next sign-up page. 5. Create a Fromography ID. This will be your Fromography login ID and cannot be changed, so think of one that is secure and easy to remember. 6. Create a Fromography password. You can change your password at any time. 7. Enter your Password Reset Question and Answer. This can be used at a later time if you forget your password. 8. Enter your e-mail address. You will receive e-mail notification when new information is available in [address] 9. Click Sign Up. You can now view and download portions of your medical record. 10. Click the Download Summary menu link to download a portable copy of your medical information. If you have questions, please visit the Frequently Asked Questions section of the [employer] website. Remember, [employer] is NOT to be used for urgent needs. For medical emergencies, dial 911. Now available from your iPhone and Android! General Information Please provide this summary of care documentation to your next provider. Patient Signature:  ____________________________________________________________ Date:  ____________________________________________________________  
  
[facility] Signature:  ____________________________________________________________ Date:  ____________________________________________________________

## 2017-04-16 LAB
ATRIAL RATE: 87 BPM
CALCULATED R AXIS, ECG10: -3 DEGREES
CALCULATED T AXIS, ECG11: 41 DEGREES
DIAGNOSIS, 93000: NORMAL
Q-T INTERVAL, ECG07: 328 MS
QRS DURATION, ECG06: 86 MS
QTC CALCULATION (BEZET), ECG08: 390 MS
VENTRICULAR RATE, ECG03: 85 BPM

## 2017-04-16 PROCEDURE — 65660000000 HC RM CCU STEPDOWN

## 2017-04-16 PROCEDURE — 74011000250 HC RX REV CODE- 250: Performed by: INTERNAL MEDICINE

## 2017-04-16 PROCEDURE — 74011250636 HC RX REV CODE- 250/636: Performed by: INTERNAL MEDICINE

## 2017-04-16 PROCEDURE — 74011250637 HC RX REV CODE- 250/637: Performed by: INTERNAL MEDICINE

## 2017-04-16 PROCEDURE — 74011636320 HC RX REV CODE- 636/320: Performed by: STUDENT IN AN ORGANIZED HEALTH CARE EDUCATION/TRAINING PROGRAM

## 2017-04-16 PROCEDURE — 77030029684 HC NEB SM VOL KT MONA -A

## 2017-04-16 PROCEDURE — 94640 AIRWAY INHALATION TREATMENT: CPT

## 2017-04-16 RX ORDER — SODIUM CHLORIDE 0.9 % (FLUSH) 0.9 %
10 SYRINGE (ML) INJECTION
Status: COMPLETED | OUTPATIENT
Start: 2017-04-16 | End: 2017-04-16

## 2017-04-16 RX ORDER — HEPARIN SODIUM 5000 [USP'U]/ML
5000 INJECTION, SOLUTION INTRAVENOUS; SUBCUTANEOUS EVERY 8 HOURS
Status: DISCONTINUED | OUTPATIENT
Start: 2017-04-16 | End: 2017-04-18

## 2017-04-16 RX ADMIN — MULTIPLE VITAMINS W/ MINERALS TAB 1 TABLET: TAB at 09:28

## 2017-04-16 RX ADMIN — FINASTERIDE 5 MG: 5 TABLET, FILM COATED ORAL at 09:27

## 2017-04-16 RX ADMIN — GUAIFENESIN 600 MG: 600 TABLET, EXTENDED RELEASE ORAL at 09:00

## 2017-04-16 RX ADMIN — Medication 10 ML: at 08:49

## 2017-04-16 RX ADMIN — ARFORMOTEROL TARTRATE 15 MCG: 15 SOLUTION RESPIRATORY (INHALATION) at 10:23

## 2017-04-16 RX ADMIN — IOPAMIDOL 100 ML: 612 INJECTION, SOLUTION INTRAVENOUS at 08:49

## 2017-04-16 RX ADMIN — GUAIFENESIN 600 MG: 600 TABLET, EXTENDED RELEASE ORAL at 18:32

## 2017-04-16 RX ADMIN — BUDESONIDE 500 MCG: 0.5 INHALANT RESPIRATORY (INHALATION) at 10:23

## 2017-04-16 RX ADMIN — BUDESONIDE 500 MCG: 0.5 INHALANT RESPIRATORY (INHALATION) at 19:46

## 2017-04-16 RX ADMIN — ARFORMOTEROL TARTRATE 15 MCG: 15 SOLUTION RESPIRATORY (INHALATION) at 19:46

## 2017-04-16 RX ADMIN — LOSARTAN POTASSIUM 50 MG: 50 TABLET ORAL at 09:27

## 2017-04-16 RX ADMIN — HEPARIN SODIUM 5000 UNITS: 5000 INJECTION, SOLUTION INTRAVENOUS; SUBCUTANEOUS at 01:26

## 2017-04-16 RX ADMIN — Medication 3 CAPSULE: at 09:27

## 2017-04-16 RX ADMIN — Medication 10 ML: at 13:47

## 2017-04-16 RX ADMIN — FOLIC ACID 1 MG: 1 TABLET ORAL at 09:26

## 2017-04-16 RX ADMIN — PANTOPRAZOLE SODIUM 40 MG: 40 TABLET, DELAYED RELEASE ORAL at 07:35

## 2017-04-16 RX ADMIN — VITAMIN D, TAB 1000IU (100/BT) 1000 UNITS: 25 TAB at 09:00

## 2017-04-16 RX ADMIN — FERROUS SULFATE TAB 325 MG (65 MG ELEMENTAL FE) 325 MG: 325 (65 FE) TAB at 07:34

## 2017-04-16 RX ADMIN — DOXAZOSIN 4 MG: 2 TABLET ORAL at 09:26

## 2017-04-16 RX ADMIN — Medication 10 ML: at 22:13

## 2017-04-16 RX ADMIN — Medication 10 ML: at 05:01

## 2017-04-16 RX ADMIN — METOPROLOL TARTRATE 50 MG: 50 TABLET ORAL at 07:35

## 2017-04-16 RX ADMIN — SODIUM CHLORIDE 100 ML/HR: 900 INJECTION, SOLUTION INTRAVENOUS at 16:20

## 2017-04-16 RX ADMIN — SODIUM CHLORIDE 100 ML/HR: 900 INJECTION, SOLUTION INTRAVENOUS at 05:01

## 2017-04-16 RX ADMIN — HEPARIN SODIUM 5000 UNITS: 5000 INJECTION, SOLUTION INTRAVENOUS; SUBCUTANEOUS at 18:31

## 2017-04-16 RX ADMIN — HEPARIN SODIUM 5000 UNITS: 5000 INJECTION, SOLUTION INTRAVENOUS; SUBCUTANEOUS at 09:28

## 2017-04-16 NOTE — ROUTINE PROCESS
Bedside shift change report given to ROME Tai (oncoming nurse) by Jonathan Velez RN (offgoing nurse). Report included the following information SBAR, Procedure Summary, MAR, Recent Results and Cardiac Rhythm AFIB.

## 2017-04-16 NOTE — PROGRESS NOTES
Hospitalist Progress Note  Modesto Ceja MD  Office: [phone]  Cell: [phone]      Date of Service:  2017  NAME:  Anitra Arzola  :  1929  MRN:  [id number]      Admission Summary:   80year old male with history of prior DVT/PE, RA, HTN, BPH, pAfib presented on 4/15/17, transferred from [place] due to abnormal findings on CT Abd/Pelvis. He had initially presented on [place] ED due to progressive abdominal distention and dyspnea x 5-6 weeks. He was found to have numerous large solid masses scattered throughout his liver, suspicious for hepatic metastatic disease. Interval history / Subjective:     He reports breathing is a little better today. Still having abdominal fullness. His daughter is at bedside and reports some additional history. He had noted increasing abdominal distention starting a few weeks ago for which he had started to go on a diet. Despite this, there was significant weight loss above expected for him given his dietary changes. He has been eating primarily soups and liquid diet with Boosts even prior to all of this occurring. They had noted positive occult blood stool for at least the last 1-2 years and he had a virtual colonoscopy performed ~1 year prior, which had not revealed any masses / lesions. He had apparently had an adverse event with prior true colonoscopy where he had undergone cardiac arrest.  His brother had been diagnosed with gastric cancer and had  due to this. Assessment & Plan:         1. 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   - CT chest 4/15 - No evidence of pulmonary nodule or mass. Innumerable large hepatic masses, compatible with metastatic disease. Diffuse peritoneal carcinomatosis. Diffuse gastric wall thickening may represent peritoneal implants or primary gastric malignancy.  Small ascites. Small hiatal hernia. Mild centrilobular emphysema. - IR consulted - will need biopsy, perhaps most accessible would be liver lesions   - holding on oncology consultation until tissue obtained, may end up being outpatient follow-up for results    2. Dyspnea   - suspect this is secondary to metastatic hepatic disease with diminished diaphragmatic excursion due to intraabdominal disease; underlying emphysema, but does not appear in acute COPD exacerbation currently   - incentive spirometry   - continue scheduled Brovana + Pulmicort    3. pAFib   - rate controlled on home metoprolol   - holding Xarelto for possible IR biopsy    4. History of DVT/PE   - s/p IVC filter, on Xarelto, holding due to planned biopsy   - check lower extremity Duplex    5. BPH   - continue home finasteride, doxazosin   - Teague placed due to urinary retention, likely void trial tomorrow    6. RA   - resume home methotrexate, folic acid    7. HTN   - he is borderline low-normal BPs   - continue metoprolol   - hold losartan, spironolactone    8. GERD   - on Protonix    9.   Leukocytosis   - suspect this is secondary to underlying malignancy / dehydration   - repeat CBC in AM    Code status: DNR  DVT prophylaxis: Lovenox    Care Plan discussed with: Patient/Family and Nurse  Disposition: TBD     Hospital Problems  Date Reviewed: 4/15/2017          Codes Class Noted POA    * (Principal)Cancer ([facility] ICD-10-CM: C80.1  ICD-9-CM: 199.1  4/15/2017 Unknown        DVT (deep vein thrombosis) in pregnancy ([facility] (Chronic) ICD-10-CM: O22.30, I82.409  ICD-9-CM: 671.30  4/15/2017 Yes        A-fib ([facility] (Chronic) ICD-10-CM: I48.91  ICD-9-CM: 427.31  4/15/2017 Yes        HTN (hypertension) (Chronic) ICD-10-CM: I10  ICD-9-CM: 401.9  4/15/2017 Yes        Pulmonary embolism (HCC) (Chronic) ICD-10-CM: I26.99  ICD-9-CM: 415.19  4/15/2017 Yes        BPH (benign prostatic hyperplasia) (Chronic) ICD-10-CM: N40.0  ICD-9-CM: 600.00  4/15/2017 Yes Review of Systems:   A comprehensive review of systems was negative except for that written in the HPI. Vital Signs:    Last 24hrs VS reviewed since prior progress note. Most recent are:  Visit Vitals    BP 94/41 (BP 1 Location: Left arm, BP Patient Position: At rest)    Pulse 82    Temp 98 °F (36.7 °C)    Resp 18    Wt 78.2 kg (172 lb 6.4 oz)    SpO2 99%         Intake/Output Summary (Last 24 hours) at 04/16/17 1404  Last data filed at 04/16/17 0609   Gross per 24 hour   Intake          1246.66 ml   Output              850 ml   Net           396.66 ml        Physical Examination:             Constitutional:  No acute distress, cooperative, pleasant    ENT:  Oral mucous moist, oropharynx benign. Neck supple,    Resp:  CTA bilaterally. No wheezing/rhonchi/rales. No accessory muscle use   CV:  Regular rhythm, normal rate, no murmurs, gallops, rubs    GI:  Soft, distended with nodularity in RUQ, non tender. normoactive bowel sounds, hepatomegaly    Musculoskeletal:  Bilateral asymmetric lower extremity edema, at least 1+ pitting R > L, warm, 2+ pulses throughout    Neurologic:  Moves all extremities. AAOx3, CN II-XII reviewed     Skin:  overlying chronic brawny skin changes in bilateral lower extremities, more prominent on R compared to L       Data Review:    Review and/or order of clinical lab test      Labs:     Recent Labs      04/15/17   2020   WBC  19.8*   HGB  8.8*   HCT  28.1*   PLT  451*     Recent Labs      04/15/17   2020   NA  135*   K  5.2*   CL  104   CO2  24   BUN  44*   CREA  1.23   GLU  112*   CA  9.2     Recent Labs      04/15/17   2020   SGOT  77*   ALT  40   AP  696*   TBILI  0.4   TP  5.7*   ALB  2.4*   GLOB  3.3     Recent Labs      04/15/17   2020   INR  1.9*   PTP  19.4*      No results for input(s): FE, TIBC, PSAT, FERR in the last 72 hours. No results found for: FOL, RBCF   No results for input(s): PH, PCO2, PO2 in the last 72 hours.   No results for input(s): CPK, CKNDX, TROIQ in the last 72 hours.     No lab exists for component: CPKMB  No results found for: CHOL, CHOLX, CHLST, CHOLV, HDL, LDL, DLDL, LDLC, DLDLP, TGL, TGLX, TRIGL, TRIGP, CHHD, CHHDX  No results found for: GLUCPOC  No results found for: COLOR, APPRN, SPGRU, REFSG, HALEIGH, PROTU, GLUCU, KETU, BILU, UROU, MELINDA, LEUKU, GLUKE, EPSU, BACTU, WBCU, RBCU, CASTS, UCRY      Medications Reviewed:     Current Facility-Administered Medications   Medication Dose Route Frequency    heparin (porcine) injection 5,000 Units  5,000 Units SubCUTAneous Q8H    sodium chloride 0.9 % bolus infusion 100 mL  100 mL IntraVENous RAD ONCE    sodium chloride (NS) flush 5-10 mL  5-10 mL IntraVENous Q8H    sodium chloride (NS) flush 5-10 mL  5-10 mL IntraVENous PRN    acetaminophen (TYLENOL) tablet 650 mg  650 mg Oral Q6H PRN    ondansetron (ZOFRAN) injection 4 mg  4 mg IntraVENous Q8H PRN    0.9% sodium chloride infusion  100 mL/hr IntraVENous CONTINUOUS    cholecalciferol (VITAMIN D3) tablet 1,000 Units  1,000 Units Oral DAILY    doxazosin (CARDURA) tablet 4 mg  4 mg Oral DAILY    ferrous sulfate tablet 325 mg  325 mg Oral ACB    finasteride (PROSCAR) tablet 5 mg  5 mg Oral DAILY    fish oil-omega-3 fatty acids 340-1,000 mg capsule 3 Cap  3 Cap Oral DAILY    folic acid (FOLVITE) tablet 1 mg  1 mg Oral DAILY    guaiFENesin ER (MUCINEX) tablet 600 mg  600 mg Oral BID    losartan (COZAAR) tablet 50 mg  50 mg Oral DAILY    [START ON 4/17/2017] methotrexate (RHEUMATREX) tablet 10 mg  10 mg Oral every Monday    metoprolol tartrate (LOPRESSOR) tablet 50 mg  50 mg Oral BID    multivitamin, tx-iron-ca-min (THERA-M w/ IRON) tablet 1 Tab  1 Tab Oral DAILY    arformoterol (BROVANA) neb solution 15 mcg  15 mcg Nebulization BID RT    And    budesonide (PULMICORT) 500 mcg/2 ml nebulizer suspension  500 mcg Nebulization BID RT    pantoprazole (PROTONIX) tablet 40 mg  40 mg Oral ACB    metoprolol (LOPRESSOR) injection 5 mg  5 mg IntraVENous Q6H PRN    albuterol (PROVENTIL VENTOLIN) nebulizer solution 2.5 mg  2.5 mg Nebulization Q4H PRN     ______________________________________________________________________  EXPECTED LENGTH OF STAY: - - -  ACTUAL LENGTH OF STAY:          1                 Cynthia Fletcher MD

## 2017-04-16 NOTE — PROGRESS NOTES
Bedside shift change report given to ROME Devine (oncoming nurse) by Tran Zarate (offgoing nurse). Report included the following information SBAR, Kardex, Intake/Output, MAR, Recent Results and Cardiac Rhythm A Fib.

## 2017-04-17 ENCOUNTER — APPOINTMENT (OUTPATIENT)
Dept: ULTRASOUND IMAGING | Age: 82
DRG: 436 | End: 2017-04-17
Attending: HOSPITALIST
Payer: MEDICARE

## 2017-04-17 ENCOUNTER — APPOINTMENT (OUTPATIENT)
Dept: CT IMAGING | Age: 82
DRG: 436 | End: 2017-04-17
Attending: HOSPITALIST
Payer: MEDICARE

## 2017-04-17 ENCOUNTER — APPOINTMENT (OUTPATIENT)
Dept: GENERAL RADIOLOGY | Age: 82
DRG: 436 | End: 2017-04-17
Attending: HOSPITALIST
Payer: MEDICARE

## 2017-04-17 LAB
AMORPH CRY URNS QL MICRO: ABNORMAL
ANION GAP BLD CALC-SCNC: 8 MMOL/L (ref 5–15)
APPEARANCE UR: ABNORMAL
BACTERIA URNS QL MICRO: NEGATIVE /HPF
BASOPHILS # BLD AUTO: 0 K/UL (ref 0–0.1)
BASOPHILS # BLD: 0 % (ref 0–1)
BILIRUB UR QL: NEGATIVE
BUN SERPL-MCNC: 35 MG/DL (ref 6–20)
BUN/CREAT SERPL: 36 (ref 12–20)
CALCIUM SERPL-MCNC: 8.7 MG/DL (ref 8.5–10.1)
CHLORIDE SERPL-SCNC: 106 MMOL/L (ref 97–108)
CO2 SERPL-SCNC: 22 MMOL/L (ref 21–32)
COLOR UR: ABNORMAL
CREAT SERPL-MCNC: 0.98 MG/DL (ref 0.7–1.3)
EOSINOPHIL # BLD: 0.2 K/UL (ref 0–0.4)
EOSINOPHIL NFR BLD: 1 % (ref 0–7)
EPITH CASTS URNS QL MICRO: ABNORMAL /LPF
ERYTHROCYTE [DISTWIDTH] IN BLOOD BY AUTOMATED COUNT: 17.8 % (ref 11.5–14.5)
GLUCOSE SERPL-MCNC: 92 MG/DL (ref 65–100)
GLUCOSE UR STRIP.AUTO-MCNC: NEGATIVE MG/DL
GRAN CASTS URNS QL MICRO: ABNORMAL /LPF
HCT VFR BLD AUTO: 28.3 % (ref 36.6–50.3)
HGB BLD-MCNC: 9 G/DL (ref 12.1–17)
HGB UR QL STRIP: ABNORMAL
KETONES UR QL STRIP.AUTO: NEGATIVE MG/DL
LEUKOCYTE ESTERASE UR QL STRIP.AUTO: ABNORMAL
LYMPHOCYTES # BLD AUTO: 5 % (ref 12–49)
LYMPHOCYTES # BLD: 1.1 K/UL (ref 0.8–3.5)
MCH RBC QN AUTO: 23.9 PG (ref 26–34)
MCHC RBC AUTO-ENTMCNC: 31.8 G/DL (ref 30–36.5)
MCV RBC AUTO: 75.3 FL (ref 80–99)
MONOCYTES # BLD: 1.8 K/UL (ref 0–1)
MONOCYTES NFR BLD AUTO: 9 % (ref 5–13)
NEUTS SEG # BLD: 17.6 K/UL (ref 1.8–8)
NEUTS SEG NFR BLD AUTO: 85 % (ref 32–75)
NITRITE UR QL STRIP.AUTO: NEGATIVE
PH UR STRIP: 5.5 [PH] (ref 5–8)
PLATELET # BLD AUTO: 437 K/UL (ref 150–400)
POTASSIUM SERPL-SCNC: 5 MMOL/L (ref 3.5–5.1)
PROT UR STRIP-MCNC: 30 MG/DL
RBC # BLD AUTO: 3.76 M/UL (ref 4.1–5.7)
RBC #/AREA URNS HPF: ABNORMAL /HPF (ref 0–5)
SODIUM SERPL-SCNC: 136 MMOL/L (ref 136–145)
SP GR UR REFRACTOMETRY: 1.03 (ref 1–1.03)
UA: UC IF INDICATED,UAUC: ABNORMAL
URATE CRY URNS QL MICRO: ABNORMAL
UROBILINOGEN UR QL STRIP.AUTO: 1 EU/DL (ref 0.2–1)
WBC # BLD AUTO: 20.8 K/UL (ref 4.1–11.1)
WBC URNS QL MICRO: ABNORMAL /HPF (ref 0–4)

## 2017-04-17 PROCEDURE — 77030018781

## 2017-04-17 PROCEDURE — 81001 URINALYSIS AUTO W/SCOPE: CPT | Performed by: HOSPITALIST

## 2017-04-17 PROCEDURE — 94640 AIRWAY INHALATION TREATMENT: CPT

## 2017-04-17 PROCEDURE — 88342 IMHCHEM/IMCYTCHM 1ST ANTB: CPT | Performed by: HOSPITALIST

## 2017-04-17 PROCEDURE — 36415 COLL VENOUS BLD VENIPUNCTURE: CPT | Performed by: STUDENT IN AN ORGANIZED HEALTH CARE EDUCATION/TRAINING PROGRAM

## 2017-04-17 PROCEDURE — 88307 TISSUE EXAM BY PATHOLOGIST: CPT | Performed by: HOSPITALIST

## 2017-04-17 PROCEDURE — 74011250636 HC RX REV CODE- 250/636: Performed by: RADIOLOGY

## 2017-04-17 PROCEDURE — 85025 COMPLETE CBC W/AUTO DIFF WBC: CPT | Performed by: STUDENT IN AN ORGANIZED HEALTH CARE EDUCATION/TRAINING PROGRAM

## 2017-04-17 PROCEDURE — 77030003503 HC NDL BIOP TISS BD -B

## 2017-04-17 PROCEDURE — 74011000250 HC RX REV CODE- 250: Performed by: INTERNAL MEDICINE

## 2017-04-17 PROCEDURE — 74000 XR ABD (KUB): CPT

## 2017-04-17 PROCEDURE — 65660000000 HC RM CCU STEPDOWN

## 2017-04-17 PROCEDURE — 87086 URINE CULTURE/COLONY COUNT: CPT | Performed by: HOSPITALIST

## 2017-04-17 PROCEDURE — 93306 TTE W/DOPPLER COMPLETE: CPT

## 2017-04-17 PROCEDURE — 77030003504 HC NDL BIOP TISS COOK -A

## 2017-04-17 PROCEDURE — 93970 EXTREMITY STUDY: CPT

## 2017-04-17 PROCEDURE — 77012 CT SCAN FOR NEEDLE BIOPSY: CPT

## 2017-04-17 PROCEDURE — 74011250636 HC RX REV CODE- 250/636: Performed by: HOSPITALIST

## 2017-04-17 PROCEDURE — 74011250637 HC RX REV CODE- 250/637: Performed by: INTERNAL MEDICINE

## 2017-04-17 PROCEDURE — 74011000250 HC RX REV CODE- 250

## 2017-04-17 PROCEDURE — 0FB13ZX EXCISION OF RIGHT LOBE LIVER, PERCUTANEOUS APPROACH, DIAGNOSTIC: ICD-10-PCS | Performed by: RADIOLOGY

## 2017-04-17 PROCEDURE — 74011250636 HC RX REV CODE- 250/636: Performed by: INTERNAL MEDICINE

## 2017-04-17 PROCEDURE — 76705 ECHO EXAM OF ABDOMEN: CPT

## 2017-04-17 PROCEDURE — 80048 BASIC METABOLIC PNL TOTAL CA: CPT | Performed by: STUDENT IN AN ORGANIZED HEALTH CARE EDUCATION/TRAINING PROGRAM

## 2017-04-17 PROCEDURE — 88173 CYTOPATH EVAL FNA REPORT: CPT | Performed by: HOSPITALIST

## 2017-04-17 PROCEDURE — 74011000250 HC RX REV CODE- 250: Performed by: HOSPITALIST

## 2017-04-17 PROCEDURE — 74011250637 HC RX REV CODE- 250/637: Performed by: HOSPITALIST

## 2017-04-17 RX ORDER — FENTANYL CITRATE 50 UG/ML
25 INJECTION, SOLUTION INTRAMUSCULAR; INTRAVENOUS
Status: DISCONTINUED | OUTPATIENT
Start: 2017-04-17 | End: 2017-04-17 | Stop reason: ALTCHOICE

## 2017-04-17 RX ORDER — ALBUMIN HUMAN 250 G/1000ML
12.5 SOLUTION INTRAVENOUS EVERY 6 HOURS
Status: COMPLETED | OUTPATIENT
Start: 2017-04-18 | End: 2017-04-18

## 2017-04-17 RX ORDER — OXYCODONE HYDROCHLORIDE 5 MG/1
5 TABLET ORAL
Status: DISCONTINUED | OUTPATIENT
Start: 2017-04-17 | End: 2017-04-20 | Stop reason: HOSPADM

## 2017-04-17 RX ORDER — ALBUTEROL SULFATE 0.83 MG/ML
2.5 SOLUTION RESPIRATORY (INHALATION)
Status: DISCONTINUED | OUTPATIENT
Start: 2017-04-17 | End: 2017-04-20 | Stop reason: HOSPADM

## 2017-04-17 RX ORDER — ALBUTEROL SULFATE 90 UG/1
1 AEROSOL, METERED RESPIRATORY (INHALATION)
Status: DISCONTINUED | OUTPATIENT
Start: 2017-04-17 | End: 2017-04-17 | Stop reason: CLARIF

## 2017-04-17 RX ORDER — MIDAZOLAM HYDROCHLORIDE 1 MG/ML
1 INJECTION, SOLUTION INTRAMUSCULAR; INTRAVENOUS
Status: DISCONTINUED | OUTPATIENT
Start: 2017-04-17 | End: 2017-04-17 | Stop reason: ALTCHOICE

## 2017-04-17 RX ORDER — ALBUTEROL SULFATE 0.83 MG/ML
SOLUTION RESPIRATORY (INHALATION)
Status: COMPLETED
Start: 2017-04-17 | End: 2017-04-17

## 2017-04-17 RX ORDER — SODIUM CHLORIDE 9 MG/ML
25 INJECTION, SOLUTION INTRAVENOUS ONCE
Status: COMPLETED | OUTPATIENT
Start: 2017-04-17 | End: 2017-04-17

## 2017-04-17 RX ADMIN — METHOTREXATE 10 MG: 2.5 TABLET ORAL at 14:58

## 2017-04-17 RX ADMIN — METOPROLOL TARTRATE 5 MG: 5 INJECTION INTRAVENOUS at 06:20

## 2017-04-17 RX ADMIN — FERROUS SULFATE TAB 325 MG (65 MG ELEMENTAL FE) 325 MG: 325 (65 FE) TAB at 14:58

## 2017-04-17 RX ADMIN — METHYLPREDNISOLONE SODIUM SUCCINATE 125 MG: 125 INJECTION, POWDER, FOR SOLUTION INTRAMUSCULAR; INTRAVENOUS at 16:02

## 2017-04-17 RX ADMIN — ALBUTEROL SULFATE 2.5 MG: 2.5 SOLUTION RESPIRATORY (INHALATION) at 20:49

## 2017-04-17 RX ADMIN — OXYCODONE HYDROCHLORIDE 5 MG: 5 TABLET ORAL at 16:02

## 2017-04-17 RX ADMIN — GUAIFENESIN 600 MG: 600 TABLET, EXTENDED RELEASE ORAL at 15:02

## 2017-04-17 RX ADMIN — METOPROLOL TARTRATE 50 MG: 50 TABLET ORAL at 09:10

## 2017-04-17 RX ADMIN — Medication 10 ML: at 06:16

## 2017-04-17 RX ADMIN — PANTOPRAZOLE SODIUM 40 MG: 40 TABLET, DELAYED RELEASE ORAL at 14:58

## 2017-04-17 RX ADMIN — MULTIPLE VITAMINS W/ MINERALS TAB 1 TABLET: TAB at 14:58

## 2017-04-17 RX ADMIN — Medication 10 ML: at 21:39

## 2017-04-17 RX ADMIN — FOLIC ACID 1 MG: 1 TABLET ORAL at 14:58

## 2017-04-17 RX ADMIN — BUDESONIDE 500 MCG: 0.5 INHALANT RESPIRATORY (INHALATION) at 20:49

## 2017-04-17 RX ADMIN — METOPROLOL TARTRATE 50 MG: 50 TABLET ORAL at 18:25

## 2017-04-17 RX ADMIN — Medication 10 ML: at 15:02

## 2017-04-17 RX ADMIN — SODIUM CHLORIDE 100 ML/HR: 900 INJECTION, SOLUTION INTRAVENOUS at 03:54

## 2017-04-17 RX ADMIN — SODIUM CHLORIDE 25 ML/HR: 900 INJECTION, SOLUTION INTRAVENOUS at 11:33

## 2017-04-17 RX ADMIN — FINASTERIDE 5 MG: 5 TABLET, FILM COATED ORAL at 14:58

## 2017-04-17 RX ADMIN — DOXAZOSIN 4 MG: 2 TABLET ORAL at 09:11

## 2017-04-17 RX ADMIN — VITAMIN D, TAB 1000IU (100/BT) 1000 UNITS: 25 TAB at 14:58

## 2017-04-17 RX ADMIN — ALBUTEROL SULFATE 2.5 MG: 2.5 SOLUTION RESPIRATORY (INHALATION) at 10:52

## 2017-04-17 RX ADMIN — SODIUM BICARBONATE 1 ML: 0.2 INJECTION, SOLUTION INTRAVENOUS at 11:49

## 2017-04-17 RX ADMIN — FENTANYL CITRATE 12.5 MCG: 50 INJECTION, SOLUTION INTRAMUSCULAR; INTRAVENOUS at 11:53

## 2017-04-17 RX ADMIN — HEPARIN SODIUM 5000 UNITS: 5000 INJECTION, SOLUTION INTRAVENOUS; SUBCUTANEOUS at 01:16

## 2017-04-17 RX ADMIN — ALBUTEROL SULFATE 2.5 MG: 2.5 SOLUTION RESPIRATORY (INHALATION) at 15:34

## 2017-04-17 NOTE — PROGRESS NOTES
Transport present to return patient to room 675 via stretcher. Awake and alert. Taking PO fluids. VSS.  DDI.

## 2017-04-17 NOTE — ROUTINE PROCESS
Patient return to room, angio sent patient to room, [facility] medical services  Diverted him for echo and vascular.   This diversion was not cleared by nursing,  Patient returned to room, VSS site CDI,

## 2017-04-17 NOTE — PROGRESS NOTES
Respiratory care in and completed Albuterol treatment. Few scattered inspiratory and expiratory wheezes present posteriorly. States feels like breathing easier. Daughter Aby at bedside. Dr. Anat Duron in to evaluate patient and obtain consent. Both patient and daughter verbalize understanding of procedure. Consent signed by patient. To CT via stretcher.

## 2017-04-17 NOTE — PROGRESS NOTES
NUTRITION COMPLETE ASSESSMENT    RECOMMENDATIONS:   1. Resume FULL liquid diet per MD - Encourage PO intake at meals  2. Daily weights      Interventions/Plan:   Food/Nutrient Delivery:    Commercial supplement (Ensure TID; Magic Cup, Boost Pudding)        Nutrition Education: (when more appropriate)      Assessment:   Reason for Assessment: [x]BPA/MST Referral (2-13# wt loss, poor appetite)     Diet: Clear liquids (for tests)  Supplements: Boost Glucose Control TID  Nutritionally Significant Medications: [x] Reviewed & Includes: vit D3, cardura, iron, omega-3, folic acid, methotrexate, MVI + iron, protonix, NS @ 100ml/hr  Meal Intake: No data found. Pre-Hospitalization:  Usual Appetite: Poor  Diet at Home: liquids/soft solids  Vitamins/Supplements: Yes (Boost/Ensure/equivalent )    Current Hospitalization:   Appetite: Poor  PO Ability: Independent Average po intake:   Average supplements intake:        Subjective:  I just can't eat that much all at once. Maybe a cup of soup or a potato. But not much more than that. Objective:  Pt admitted for cancer from [place]. PMHx: HTN, RA, Guillian-barre, afib. Abd pain and distention x 5-6 weeks PTA. CT showing numerous abdominal masses, likely liver mets. SOB also noted, likely d/t metastatic dx per MD. Liver bx completed today. Wt loss noted per H&P but amount not recorded. Had cut back on intake some but wt loss exceeding expected wt loss for diet changes. Eating mostly soups and liquid supplements prior to admit. Notes hx of some difficulty swallowing in the past. Reports seen more esophageal in nature and daughter reports previous UGI? was inconclusive. SOB has also made it difficult in regards to PO intake. Discussed smaller, more frequent meals and additional supplements to trial. Pt deferring education to alter date for hi sia, hi protein since he is tired today.  Will switch to Ensure Enlive TID for higher nutrient density (1050kcal, 60g protein) with Magic Cup and Boost Pudding between meals for snacks. 530kcal, 15g protein. Plan to follow for PO intake/tolerance and diet education as appropriate. Unsure of UBW. Wt up since admit wt of 77.9kg with 2+ BLE present. Last 3 Recorded Weights in this Encounter    04/15/17 1600 04/16/17 0247 04/17/17 0249   Weight: 77.9 kg (171 lb 11.8 oz) 78.2 kg (172 lb 6.4 oz) 82.9 kg (182 lb 12.2 oz)     Estimated Nutrition Needs:   Kcals/day: 4592 Kcals/day (1787-1925kcal)  Protein: 93 g (93-109g (1.2-1.4g/kg))  Fluid: 1800 ml (1ml/kcal)  Based On: [place] (x 1.3-1.4)  Weight Used: Other (comment) (admit wt 77.9kg)    Pt expected to meet estimated nutrient needs:  []   Yes     [x]  No [] Unable to predict at this time  Nutrition Diagnosis:   1. Unintended weight loss related to increased energy expenditure; inadequate protein/energy intake as evidenced by pt reports; decreased PO intake; new dx of CA w/ mets    2.  Inadequate oral food/beverage intake related to early satiety and SOB w/ abd mets; swallowing difficulty as evidenced by pt reports; soft diet/supplements at home    Goals:     Consumption of at least 50% of meals and 3-4 supplements/day in 5-7 days     Monitoring & Evaluation:    - Liquid meal replacement, Total energy intake, Protein intake   - Weight/weight change, GI (swallowing)   -      Previous Nutrition Goals Met:   N/A  Previous Recommendations:    N/A    Education & Discharge Needs:   [] None Identified   [x] Identified and to be addressed at later date    [] Participated in care plan, discharge planning, and/or interdisciplinary rounds        Cultural, Mu-ism and ethnic food preferences identified: None    Skin Integrity: [x]Intact  []Other  Edema: []None [x]Other: 2+ BLE, abd distention  Last BM: 4/14  Food Allergies: [x]None []Other  Diet Restrictions: Cultural/Mormonism Preference(s): None     Anthropometrics:    Weight Loss Metrics 4/17/2017   Today's Wt 182 lb 12.2 oz   BMI 30.41 kg/m2 Weight Source: Bed  Height: 5' 5\" (165.1 cm),    Body mass index is 30.41 kg/(m^2).   IBW : 61.7 kg (136 lb), % IBW (Calculated): 134.38 %   ,      Labs:    Lab Results   Component Value Date/Time    Sodium 136 04/17/2017 02:51 AM    Potassium 5.0 04/17/2017 02:51 AM    Chloride 106 04/17/2017 02:51 AM    CO2 22 04/17/2017 02:51 AM    Glucose 92 04/17/2017 02:51 AM    BUN 35 04/17/2017 02:51 AM    Creatinine 0.98 04/17/2017 02:51 AM    Calcium 8.7 04/17/2017 02:51 AM    Albumin 2.4 04/15/2017 08:20 PM     Jayson Martin RD

## 2017-04-17 NOTE — PROGRESS NOTES
Liver biopsy completed. Awake and alert. Denies discomfort. Bandaid right upper abd dry and intact. Daughter made aware of completion of procedure. Grandson at bedside. Remains on monitor.

## 2017-04-17 NOTE — PROGRESS NOTES
Report called to [facility] regarding liver biopsy. Medications given, biopsy site, VS, mental status, pain control and post orders reviewed. Abdomen with dry and intact bandaid at biopsy site. HOB elevated for comfort. Continues with 02 at 2L NC for comfort. VSS. Requesting lunch tray. Transport requested.

## 2017-04-17 NOTE — PROGRESS NOTES
CM met with pt's daughters as pt is off of the floor. Cm introduced them to the role of Cm and transition of care. They verbalized understanding. This pt was independent prior to admission. Per the daughter, Rogelio Umaña ([phone]), she is an RN and she plans to live with pt when he is discharged. She stated that her sister, Rosalie Yanez ([phone]) will stay with this pt when she  Brian Nova) is working. This pt has a cane, wheelchair and tub bench . He also has continuous O2 set up with [employer]. If he needs any rehab, the family would like him to go to [facility]. CM will follow for d/c needs. [address] Management Interventions  PCP Verified by CM: Yes  Palliative Care Consult (Criteria: CHF and RRAT>21): No  MyChart Signup: No  Discharge Durable Medical Equipment: No  Physical Therapy Consult: No  Occupational Therapy Consult: No  Speech Therapy Consult: No  Current Support Network: Lives Alone  Confirm Follow Up Transport: Family  Plan discussed with Pt/Family/Caregiver: Yes  Freedom of Choice Offered:  Yes

## 2017-04-17 NOTE — PROCEDURES
[facility]  *** FINAL REPORT ***    Name: Errol Roca  MRN: [id number]    Inpatient  : 11 Dec 1929  HIS Order #: [id number]  [address] [facility] Visit #: [id number]  Date: 2017    TYPE OF TEST: Peripheral Venous Testing    REASON FOR TEST  Edema    Right Leg:-  Deep venous thrombosis:           Yes  Proximal extent of thrombus:      Proximal Femoral  Superficial venous thrombosis:    No  Deep venous insufficiency:        Not examined  Superficial venous insufficiency: Not examined    Left Leg:-  Deep venous thrombosis:           No  Superficial venous thrombosis:    No  Deep venous insufficiency:        Not examined  Superficial venous insufficiency: Not examined      INTERPRETATION/FINDINGS  PROCEDURE:  Color duplex ultrasound imaging of lower extremity veins. FINDINGS:       Right: Consistent with partial thrombosis involving the proximal  mid and distal  femoral vein as demonstrated by vein  non-compressibility, and by a narrowing or occlusion of the flow  channel on color Doppler imaging. The common femoral, deep  femoral,popliteal, posterior tibial, peroneal, and great saphenous are   patent and without evidence of thrombus;  each is fully compressible  and there is no narrowing of the flow channel on color Doppler  imaging. Phasic flow is observed in the common femoral vein. Left:   The common femoral, deep femoral, femoral, popliteal,  posterior tibial, peroneal, and great saphenous are patent and without   evidence of thrombus;  each is fully compressible and there is no  narrowing of the flow channel on color Doppler imaging. Phasic flow  is observed in the common femoral vein. IMPRESSION: There is evidence of vein thrombosis, as described above. The ultrasound appearance is more consistent with an acute than a  chronic process. ADDITIONAL COMMENTS    I have personally reviewed the data relevant to the interpretation of  this  study. TECHNOLOGIST: Juan R Ridley.  CLAUDIA Gamez  Signed: 04/17/2017 01:52 PM    PHYSICIAN: Angely Watts MD  Signed: 04/18/2017 06:24 AM

## 2017-04-17 NOTE — INTERDISCIPLINARY ROUNDS
IDR/SLIDR Summary          Patient: Britni Wilkins MRN: [id number]    Age: 80 y.o. YOB: 1929 Room/Bed: [facility]   Admit Diagnosis: Mestatic Liver Cancer  Cancer ([email or web address]  Principal Diagnosis: Cancer ([email or web address]   Goals: Complete testing today  Readmission: NO  Quality Measure: Not applicable  VTE Prophylaxis: Chemical  Influenza Vaccine screening completed? YES  Pneumococcal Vaccine screening completed? NO  Mobility needs: Yes   Nutrition plan:Yes  Consults:P.T, O.T., Respiratory and Case Management    Financial concerns:No  Escalated to CM? YES  RRAT Score: 15   Interventions:Home Health, Palliative Care , COPD Educator to follow  and [facility]   Testing due for pt today?  YES  LOS: 2 days Expected length of stay 5 days  Discharge plan: SNF vs Home   PCP: David Colon MD  Transportation needs: No    Days before discharge:two or more days before discharge   Discharge disposition: Rehab    Signed:     Mago Oconnell RN  4/17/2017  12:26 AM

## 2017-04-18 ENCOUNTER — APPOINTMENT (OUTPATIENT)
Dept: ULTRASOUND IMAGING | Age: 82
DRG: 436 | End: 2017-04-18
Attending: HOSPITALIST
Payer: MEDICARE

## 2017-04-18 LAB
ANION GAP BLD CALC-SCNC: 10 MMOL/L (ref 5–15)
ANION GAP BLD CALC-SCNC: 9 MMOL/L (ref 5–15)
BASOPHILS # BLD AUTO: 0 K/UL (ref 0–0.1)
BASOPHILS # BLD: 0 % (ref 0–1)
BUN SERPL-MCNC: 36 MG/DL (ref 6–20)
BUN SERPL-MCNC: 38 MG/DL (ref 6–20)
BUN/CREAT SERPL: 28 (ref 12–20)
BUN/CREAT SERPL: 32 (ref 12–20)
CALCIUM SERPL-MCNC: 8.8 MG/DL (ref 8.5–10.1)
CALCIUM SERPL-MCNC: 9.1 MG/DL (ref 8.5–10.1)
CHLORIDE SERPL-SCNC: 106 MMOL/L (ref 97–108)
CHLORIDE SERPL-SCNC: 107 MMOL/L (ref 97–108)
CO2 SERPL-SCNC: 21 MMOL/L (ref 21–32)
CO2 SERPL-SCNC: 21 MMOL/L (ref 21–32)
CREAT SERPL-MCNC: 1.11 MG/DL (ref 0.7–1.3)
CREAT SERPL-MCNC: 1.37 MG/DL (ref 0.7–1.3)
DIFFERENTIAL METHOD BLD: ABNORMAL
EOSINOPHIL # BLD: 0 K/UL (ref 0–0.4)
EOSINOPHIL NFR BLD: 0 % (ref 0–7)
ERYTHROCYTE [DISTWIDTH] IN BLOOD BY AUTOMATED COUNT: 17.6 % (ref 11.5–14.5)
GLUCOSE SERPL-MCNC: 106 MG/DL (ref 65–100)
GLUCOSE SERPL-MCNC: 150 MG/DL (ref 65–100)
HCT VFR BLD AUTO: 30.2 % (ref 36.6–50.3)
HGB BLD-MCNC: 9.6 G/DL (ref 12.1–17)
LYMPHOCYTES # BLD AUTO: 2 % (ref 12–49)
LYMPHOCYTES # BLD: 0.5 K/UL (ref 0.8–3.5)
MCH RBC QN AUTO: 24.3 PG (ref 26–34)
MCHC RBC AUTO-ENTMCNC: 31.8 G/DL (ref 30–36.5)
MCV RBC AUTO: 76.5 FL (ref 80–99)
MONOCYTES # BLD: 0.3 K/UL (ref 0–1)
MONOCYTES NFR BLD AUTO: 1 % (ref 5–13)
NEUTS BAND NFR BLD MANUAL: 2 % (ref 0–6)
NEUTS SEG # BLD: 24.4 K/UL (ref 1.8–8)
NEUTS SEG NFR BLD AUTO: 95 % (ref 32–75)
PLATELET # BLD AUTO: 413 K/UL (ref 150–400)
POTASSIUM SERPL-SCNC: 5.2 MMOL/L (ref 3.5–5.1)
POTASSIUM SERPL-SCNC: 5.4 MMOL/L (ref 3.5–5.1)
RBC # BLD AUTO: 3.95 M/UL (ref 4.1–5.7)
RBC MORPH BLD: ABNORMAL
SODIUM SERPL-SCNC: 137 MMOL/L (ref 136–145)
SODIUM SERPL-SCNC: 137 MMOL/L (ref 136–145)
WBC # BLD AUTO: 25.2 K/UL (ref 4.1–11.1)

## 2017-04-18 PROCEDURE — 74011250636 HC RX REV CODE- 250/636: Performed by: INTERNAL MEDICINE

## 2017-04-18 PROCEDURE — 74011250636 HC RX REV CODE- 250/636: Performed by: HOSPITALIST

## 2017-04-18 PROCEDURE — 76705 ECHO EXAM OF ABDOMEN: CPT

## 2017-04-18 PROCEDURE — 80048 BASIC METABOLIC PNL TOTAL CA: CPT | Performed by: HOSPITALIST

## 2017-04-18 PROCEDURE — 74011250637 HC RX REV CODE- 250/637: Performed by: INTERNAL MEDICINE

## 2017-04-18 PROCEDURE — 74011000250 HC RX REV CODE- 250: Performed by: HOSPITALIST

## 2017-04-18 PROCEDURE — 77010033678 HC OXYGEN DAILY

## 2017-04-18 PROCEDURE — 74011250637 HC RX REV CODE- 250/637: Performed by: HOSPITALIST

## 2017-04-18 PROCEDURE — 36415 COLL VENOUS BLD VENIPUNCTURE: CPT | Performed by: HOSPITALIST

## 2017-04-18 PROCEDURE — 85025 COMPLETE CBC W/AUTO DIFF WBC: CPT | Performed by: HOSPITALIST

## 2017-04-18 PROCEDURE — P9047 ALBUMIN (HUMAN), 25%, 50ML: HCPCS | Performed by: HOSPITALIST

## 2017-04-18 PROCEDURE — 94640 AIRWAY INHALATION TREATMENT: CPT

## 2017-04-18 PROCEDURE — 74011000250 HC RX REV CODE- 250: Performed by: INTERNAL MEDICINE

## 2017-04-18 PROCEDURE — 65660000000 HC RM CCU STEPDOWN

## 2017-04-18 RX ORDER — ENOXAPARIN SODIUM 100 MG/ML
1 INJECTION SUBCUTANEOUS EVERY 12 HOURS
Status: DISCONTINUED | OUTPATIENT
Start: 2017-04-18 | End: 2017-04-18

## 2017-04-18 RX ORDER — SODIUM POLYSTYRENE SULFONATE 15 G/60ML
15 SUSPENSION ORAL; RECTAL
Status: COMPLETED | OUTPATIENT
Start: 2017-04-18 | End: 2017-04-18

## 2017-04-18 RX ADMIN — HEPARIN SODIUM 5000 UNITS: 5000 INJECTION, SOLUTION INTRAVENOUS; SUBCUTANEOUS at 00:30

## 2017-04-18 RX ADMIN — SODIUM CHLORIDE 75 ML/HR: 900 INJECTION, SOLUTION INTRAVENOUS at 00:00

## 2017-04-18 RX ADMIN — Medication 10 ML: at 05:41

## 2017-04-18 RX ADMIN — ALBUMIN (HUMAN) 12.5 G: 0.25 INJECTION, SOLUTION INTRAVENOUS at 00:20

## 2017-04-18 RX ADMIN — Medication 10 ML: at 21:26

## 2017-04-18 RX ADMIN — ALBUTEROL SULFATE 2.5 MG: 2.5 SOLUTION RESPIRATORY (INHALATION) at 12:27

## 2017-04-18 RX ADMIN — FINASTERIDE 5 MG: 5 TABLET, FILM COATED ORAL at 08:10

## 2017-04-18 RX ADMIN — ALBUTEROL SULFATE 2.5 MG: 2.5 SOLUTION RESPIRATORY (INHALATION) at 01:37

## 2017-04-18 RX ADMIN — PANTOPRAZOLE SODIUM 40 MG: 40 TABLET, DELAYED RELEASE ORAL at 06:59

## 2017-04-18 RX ADMIN — SODIUM CHLORIDE 75 ML/HR: 900 INJECTION, SOLUTION INTRAVENOUS at 21:25

## 2017-04-18 RX ADMIN — GUAIFENESIN 600 MG: 600 TABLET, EXTENDED RELEASE ORAL at 17:28

## 2017-04-18 RX ADMIN — SODIUM POLYSTYRENE SULFONATE 15 G: 15 SUSPENSION ORAL; RECTAL at 10:46

## 2017-04-18 RX ADMIN — BUDESONIDE 500 MCG: 0.5 INHALANT RESPIRATORY (INHALATION) at 21:57

## 2017-04-18 RX ADMIN — METOPROLOL TARTRATE 50 MG: 50 TABLET ORAL at 17:28

## 2017-04-18 RX ADMIN — ALBUTEROL SULFATE 2.5 MG: 2.5 SOLUTION RESPIRATORY (INHALATION) at 07:11

## 2017-04-18 RX ADMIN — RIVAROXABAN 20 MG: 20 TABLET, FILM COATED ORAL at 17:28

## 2017-04-18 RX ADMIN — ALBUMIN (HUMAN) 12.5 G: 0.25 INJECTION, SOLUTION INTRAVENOUS at 11:51

## 2017-04-18 RX ADMIN — ALBUTEROL SULFATE 2.5 MG: 2.5 SOLUTION RESPIRATORY (INHALATION) at 21:57

## 2017-04-18 RX ADMIN — FERROUS SULFATE TAB 325 MG (65 MG ELEMENTAL FE) 325 MG: 325 (65 FE) TAB at 06:59

## 2017-04-18 RX ADMIN — ALBUTEROL SULFATE 2.5 MG: 2.5 SOLUTION RESPIRATORY (INHALATION) at 17:08

## 2017-04-18 RX ADMIN — BUDESONIDE 500 MCG: 0.5 INHALANT RESPIRATORY (INHALATION) at 07:10

## 2017-04-18 RX ADMIN — ALBUMIN (HUMAN) 12.5 G: 0.25 INJECTION, SOLUTION INTRAVENOUS at 05:40

## 2017-04-18 RX ADMIN — METOPROLOL TARTRATE 50 MG: 50 TABLET ORAL at 08:10

## 2017-04-18 RX ADMIN — MULTIPLE VITAMINS W/ MINERALS TAB 1 TABLET: TAB at 08:11

## 2017-04-18 RX ADMIN — ALBUMIN (HUMAN) 12.5 G: 0.25 INJECTION, SOLUTION INTRAVENOUS at 17:28

## 2017-04-18 RX ADMIN — GUAIFENESIN 600 MG: 600 TABLET, EXTENDED RELEASE ORAL at 09:00

## 2017-04-18 NOTE — CONSULTS
Hematology/Oncology Consult    REASON FOR CONSULT: Metastatic cancer, unknown primary  REQUESTED BY: Dr. Davidson cMcoy: Mr. Sami Ramirez is a 80 y.o. male who we are asked to see in consultation for newly found liver lesions with unknown primary. Mr. Sami Ramirez came to the ED on 4/16/17 with progressive shortness of breath and abdominal distention. These symptoms began about 3 months ago and shortness of breath significantly worsened on recent trip to [place] with family. He states he knew that he should not have gone on the trip, but did not want to miss out with family. While on vacation, he spent most days in the hotel due to dyspnea. He was able to walk about 10 steps before he would have to stop and rest. He states they took taxis for one block when he was out of the hotel. He has also been experiencing increasing fatigue and unintentional weight loss. 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 He has no personal history of cancer. Former smoker, quit in the 1960's. Denies fevers, chills, nausea, vomiting, diarrhea, hematuria, or recent falls. CT Chest obtained without evidence of pulmonary nodule or mass. There are innumerable large hepatic masses consistent with metastatic disease. Diffuse peritoneal carcinomatosis. Diffuse gastric wall thickening which may represent gastric primary. CT guided biopsy of the liver obtained yesterday. KUB without evidence of bowel obstruction. Ultrasound with simple ascites. Patient and daughter present during my visit. Daughter is a nurse in [place]. No past medical history on file. No past surgical history on file.     No Known Allergies    Current Facility-Administered Medications   Medication Dose Route Frequency Provider Last Rate Last Dose    rivaroxaban (XARELTO) tablet 20 mg  20 mg Oral DAILY WITH DINNER SUCDJVJ Christin Kumar MD        albuterol (PROVENTIL VENTOLIN) nebulizer solution 2.5 mg  2.5 mg Nebulization Q4H PRN Denis Mejia MD   2.5 mg at 04/18/17 1227    albuterol (PROVENTIL VENTOLIN) nebulizer solution 2.5 mg  2.5 mg Nebulization Q6H RT Denis Mejia MD   2.5 mg at 04/18/17 0711    oxyCODONE IR (ROXICODONE) tablet 5 mg  5 mg Oral Q4H PRN Denis Mejia MD   5 mg at 04/17/17 1602    albumin human 25% (BUMINATE) solution 12.5 g  12.5 g IntraVENous Q6H Denis Mejia MD   12.5 g at 04/18/17 1151    sodium chloride (NS) flush 5-10 mL  5-10 mL IntraVENous Alyssabury, DO   Stopped at 04/18/17 1400    sodium chloride (NS) flush 5-10 mL  5-10 mL IntraVENous PRN Caroline Wick, DO   10 mL at 04/16/17 0849    acetaminophen (TYLENOL) tablet 650 mg  650 mg Oral Q6H PRN Caroline Wick, DO        ondansetron [facility]) injection 4 mg  4 mg IntraVENous Q8H PRN Carolinedonato Wick, DO        0.9% sodium chloride infusion  75 mL/hr IntraVENous CONTINUOUS Denis Mejia MD 75 mL/hr at 04/18/17 0000 75 mL/hr at 04/18/17 0000    cholecalciferol (VITAMIN D3) tablet 1,000 Units  1,000 Units Oral DAILY Caroline Wick, DO   1,000 Units at 04/17/17 1458    doxazosin (CARDURA) tablet 4 mg  4 mg Oral DAILY Caroline Shamika, DO   Stopped at 04/18/17 5780    ferrous sulfate tablet 325 mg  325 mg Oral ACB Miranda Carlin, DO   325 mg at 04/18/17 4573    finasteride (PROSCAR) tablet 5 mg  5 mg Oral DAILY Miranda Carlin, DO   5 mg at 04/18/17 0810    fish oil-omega-3 fatty acids 340-1,000 mg capsule 3 Cap  3 Cap Oral DAILY Miranda Carlin, DO   3 Cap at 95/59/17 2219    folic acid (FOLVITE) tablet 1 mg  1 mg Oral DAILY Miranda Carlin DO   1 mg at 04/17/17 3411    guaiFENesin ER (MUCINEX) tablet 600 mg  600 mg Oral BID Caroline Wick DO   600 mg at 04/18/17 0900    methotrexate (RHEUMATREX) tablet 10 mg  10 mg Oral every Monday Caroline Wick DO   10 mg at 04/17/17 4872    metoprolol tartrate (LOPRESSOR) tablet 50 mg  50 mg Oral BID Tanja Ruby, DO   50 mg at 04/18/17 0810    multivitamin, tx-iron-ca-min (THERA-M w/ IRON) tablet 1 Tab  1 Tab Oral DAILY Tanja Ruby, DO   1 Tab at 04/18/17 2438    arformoterol (BROVANA) neb solution 15 mcg  15 mcg Nebulization BID RT Tanja Ruby, DO   Stopped at 04/17/17 2000    And    budesonide (PULMICORT) 500 mcg/2 ml nebulizer suspension  500 mcg Nebulization BID RT Miranda Dineroman, DO   500 mcg at 04/18/17 0710    pantoprazole (PROTONIX) tablet 40 mg  40 mg Oral ACB Tanja Ruby, DO   40 mg at 04/18/17 2956    metoprolol (LOPRESSOR) injection 5 mg  5 mg IntraVENous Q6H PRN Tanja Ruby, DO   5 mg at 04/17/17 0620       Social History     Social History    Marital status:      Spouse name: N/A    Number of children: N/A    Years of education: N/A     Social History Main Topics    Smoking status: Not on file    Smokeless tobacco: Not on file    Alcohol use Not on file    Drug use: Not on file    Sexual activity: Not on file     Other Topics Concern    Not on file     Social History Narrative       No family history on file. ROS  As per the HPI, otherwise a comprehensive ROS is negative. ECOG PS is 2-3. Emotional well being addressed and patient is coping well.     Physical Examination:   Visit Vitals    /65 (BP 1 Location: Left arm, BP Patient Position: At rest)    Pulse 92    Temp 98.2 °F (36.8 °C)    Resp 20    Ht 5' 5\" (1.651 m)    Wt 182 lb 8.7 oz (82.8 kg)    SpO2 98%    BMI 30.38 kg/m2     General appearance - alert, elderly male with dyspnea during conversation, supportive daughter present  Mental status - oriented to person, place, and time  Mouth - mucous membranes moist, pharynx normal without lesions  Neck - supple  Chest - clear to auscultation anterior lung fields  Heart - normal rate, regular rhythm, normal S1, S2, no murmurs, rubs, clicks or gallops  Abdomen - distended, tight, non tender, mildly firm  Neurological - normal speech, no focal findings or movement disorder noted  Musculoskeletal - RA  Extremities - peripheral pulses normal, 1+ BLE edema  Skin - discoloration, small abrasion noted on right lower leg. Family states this looks like his \"normal\"     LABS  Lab Results   Component Value Date/Time    WBC 25.2 04/18/2017 01:28 AM    HGB 9.6 04/18/2017 01:28 AM    HCT 30.2 04/18/2017 01:28 AM    PLATELET 534 59/59/7494 01:28 AM    MCV 76.5 04/18/2017 01:28 AM    ABS. NEUTROPHILS 24.4 04/18/2017 01:28 AM     Lab Results   Component Value Date/Time    Sodium 137 04/18/2017 01:28 AM    Potassium 5.4 04/18/2017 01:28 AM    Chloride 106 04/18/2017 01:28 AM    CO2 21 04/18/2017 01:28 AM    Glucose 150 04/18/2017 01:28 AM    BUN 38 04/18/2017 01:28 AM    Creatinine 1.37 04/18/2017 01:28 AM    GFR est AA 60 04/18/2017 01:28 AM    GFR est non-AA 49 04/18/2017 01:28 AM    Calcium 8.8 04/18/2017 01:28 AM     Lab Results   Component Value Date/Time    AST (SGOT) 77 04/15/2017 08:20 PM    Alk. phosphatase 696 04/15/2017 08:20 PM    Protein, total 5.7 04/15/2017 08:20 PM    Albumin 2.4 04/15/2017 08:20 PM    Globulin 3.3 04/15/2017 08:20 PM    A-G Ratio 0.7 04/15/2017 08:20 PM       PATHOLOGY  [facility] 4/17/17 - metastatic adenocarcinoma, final path pending    IMAGING  CT Results (most recent):    Results from [place] encounter on 04/15/17   CT BX LIVER NDL PERC   Narrative EXAM:  CT BX LIVER NDL PERC    INDICATION:   Diffuse liver masses and peritoneal carcinomatosis suspicious for  neoplasm with unknown primary. CT dose reduction was achieved through use of a standardized protocol tailored  for this examination and automatic exposure control for dose modulation. FINDINGS: The procedure including the risk of bleeding and infection was  explained to the patient and verbal and written informed consent was obtained.   The patient was placed into the CT scanner in the supine position, images were  obtained and a site was localized for biopsy of a large mass in the inferior  right lobe of liver. On the  images there is redemonstrated peritoneal  carcinomatosis and there is an appearance of thickening of the gastric wall  diffusely along the major curvature. The skin was marked and prepped and draped in sterile fashion and anesthetized  with 1% lidocaine. A 20-gauge Temno coaxial biopsy system was utilized. The  introducer needle was advanced to the margin of the mass and through this 3 core  biopsy specimens were obtained which appeared scan and were reported is mostly  necrotic tissue by the cytopathologist. [address] [place] [address] final aspirations  within performed through the trocar needle which yielded better material in the  first sample with the second 2 samples placed directly in formalin. Finally, 2  additional 21 age Temno core biopsies were obtained and placed directly in  formalin which appeared visually more substantial than the prior score biopsies. The pathologist was present and confirmed probable adequacy of the tissue  sample. Touch preparations were made. The needle was removed and a bandage  applied. The patient was monitored by the radiology nurse throughout the procedure with  pulse oximetry and EKG monitoring and Versed and fentanyl were administered for  conscious sedation. The patient tolerated the procedure well without apparent  complication and will be recovered in the radiology holding unit and discharged  to home if stable. Impression IMPRESSION: CT guided right lobe liver mass biopsy performed. Peritoneal  carcinomatosis and diffuse thickening of the greater curvature of the gastric  wall suggestive of gastric involvement and raising question of gastric carcinoma  or lymphoma. Pathology pending. ASSESSMENT  Mr. Patrizia Hall is a 80 y.o. male     DISCUSSION/PLAN  1. Metastatic Adenocarcinoma to the Liver.  Noted on CT scans. Unknown primary at this time. No evidence of disease in the lung. Gastric wall thickening noted, possible gastric primary? Discussed results of scans with patient and daughter. Discussed that while we do not know the primary, this is metastatic disease and we cannot cure this. Our goals of therapy would be palliative in nature. We discussed Hospice and what that would entail. Patient clearly states that he does not want chemotherapy. Daughter is in agreement with his wishes. Discussed waiting on final pathology to determine if immunotherapy would be an option for him. Awaiting final pathology from biopsy yesterday. They have an Oncologist they are familiar with in [place], [place] Discussed that we would be happy to assist in transferring care/records to that physician if they would like. 2. Ascites. Related to malignancy. Paracentesis today. Get cytology. 3. Dyspnea. Related to #2. Palliative paracentesis today and moving forward as needed to assist with symptom management. Pt seen today in conjunction with NP A Sandridge  Awaiting path for further prognostic discussion. Pt and family realistic and want supportive care near home in [place]. Appreciate consultation and care of Mr. Hansel Storey. We will continue to follow along. Please call with any questions.     Jd Hunter, DO

## 2017-04-18 NOTE — PROGRESS NOTES
Hospitalist Progress Note  Santiago David MD  Office: [phone]        Date of Service:2017,late entry on  2017  NAME:  Friddie Pallas  :  1929  MRN:  [id number]      Admission Summary:   80year old male with history of prior DVT/PE, RA, HTN, BPH, pAfib presented on 4/15/17, transferred from [place] due to abnormal findings on CT Abd/Pelvis. He had initially presented on [place] ED due to progressive abdominal distention and dyspnea x 5-6 weeks. He was found to have numerous large solid masses scattered throughout his liver, suspicious for hepatic metastatic disease. Interval history / Subjective:      SOb and abdominal distension. Daughter at bedside. Sending him for KUB and abdominal US    Assessment & Plan:         1. 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   - CT chest 4/15 - No evidence of pulmonary nodule or mass. Innumerable large hepatic masses, compatible with metastatic disease. Diffuse peritoneal carcinomatosis. Diffuse gastric wall thickening may represent peritoneal implants or primary gastric malignancy. Small ascites. Small hiatal hernia. Mild centrilobular emphysema. - IR consulted - s/p need biopsy,   -Consulted oncology for some guidance,he may not be good candidate for any therapy. Patient and daughter aware and they are realistic. 2.  Dyspnea,multifactorial largely due to abdominal distension,was wheezing as well   - COPD,abdominal distension  -Start steroids,he is wheezing  -abdominal US    3. pAFib   - rate controlled on home metoprolol   - holding Xarelto for possible IR biopsy    4. History of DVT/PE   - s/p IVC filter, on Xarelto, holding due to planned biopsy   - check lower extremity Duplex    5.   BPH   - continue home finasteride, doxazosin   - Teague placed due to urinary retention, likely void trial tomorrow    6. RA   - resume home methotrexate, folic acid    7. HTN   - he is borderline low-normal BPs   - continue metoprolol   - hold losartan, spironolactone    8. GERD   - on Protonix    9. Leukocytosis   - suspect this is secondary to underlying malignancy / dehydration      Code status: DNR  DVT prophylaxis: Lovenox    Care Plan discussed with: discussed with patient and daughter who is an RN. Disposition: TBD     Hospital Problems  Date Reviewed: 4/15/2017          Codes Class Noted POA    * (Principal)Cancer [facility]) ICD-10-CM: C80.1  ICD-9-CM: 199.1  4/15/2017 Unknown        DVT (deep vein thrombosis) in pregnancy ([facility] [address] (Chronic) ICD-10-CM: O22.30, I82.409  ICD-9-CM: 671.30  4/15/2017 Yes        A-fib ([facility] [address] (Chronic) ICD-10-CM: I48.91  ICD-9-CM: 427.31  4/15/2017 Yes        HTN (hypertension) (Chronic) ICD-10-CM: I10  ICD-9-CM: 401.9  4/15/2017 Yes        Pulmonary embolism (HCC) (Chronic) ICD-10-CM: I26.99  ICD-9-CM: 415.19  4/15/2017 Yes        BPH (benign prostatic hyperplasia) (Chronic) ICD-10-CM: N40.0  ICD-9-CM: 600.00  4/15/2017 Yes                Review of Systems:   A comprehensive review of systems was negative except for that written in the HPI. Vital Signs:    Last 24hrs VS reviewed since prior progress note. Most recent are:  Visit Vitals    /62 (BP 1 Location: Left arm, BP Patient Position: At rest)    Pulse 74    Temp 98.3 °F (36.8 °C)    Resp 19    Ht 5' 5\" (1.651 m)    Wt 82.8 kg (182 lb 8.7 oz)    SpO2 98%    BMI 30.38 kg/m2       No intake or output data in the 24 hours ending 04/18/17 0810     Physical Examination:             Constitutional:  No acute distress, cooperative, pleasant    ENT:  Oral mucous moist, oropharynx benign. Neck supple,    Resp:  Diffuse wheezing    CV:  Regular rhythm, normal rate, no murmurs, gallops, rubs    GI:  Soft, distended with nodularity in RUQ, non tender.  normoactive bowel sounds, hepatomegaly Musculoskeletal:  Bilateral asymmetric lower extremity edema, at least 1+ pitting R > L, warm, 2+ pulses throughout    Neurologic:  Moves all extremities. AAOx3, CN II-XII reviewed     Skin:  overlying chronic brawny skin changes in bilateral lower extremities, more prominent on R compared to L       Data Review:    Review and/or order of clinical lab test      Labs:     Recent Labs      04/18/17 0128 04/17/17 0251   WBC  25.2*  20.8*   HGB  9.6*  9.0*   HCT  30.2*  28.3*   PLT  413*  437*     Recent Labs      04/18/17   0128  04/17/17   0251  04/15/17   2020   NA  137  136  135*   K  5.4*  5.0  5.2*   CL  106  106  104   CO2  21  22  24   BUN  38*  35*  44*   CREA  1.37*  0.98  1.23   GLU  150*  92  112*   CA  8.8  8.7  9.2     Recent Labs      04/15/17   2020   SGOT  77*   ALT  40   AP  696*   TBILI  0.4   TP  5.7*   ALB  2.4*   GLOB  3.3     Recent Labs      04/15/17   2020   INR  1.9*   PTP  19.4*      No results for input(s): FE, TIBC, PSAT, FERR in the last 72 hours. No results found for: FOL, RBCF   No results for input(s): PH, PCO2, PO2 in the last 72 hours. No results for input(s): CPK, CKNDX, TROIQ in the last 72 hours.     No lab exists for component: CPKMB  No results found for: CHOL, CHOLX, CHLST, CHOLV, HDL, LDL, DLDL, LDLC, DLDLP, TGL, TGLX, TRIGL, TRIGP, CHHD, CHHDX  No results found for: [facility]  Lab Results   Component Value Date/Time    Color YELLOW/STRAW 04/17/2017 09:42 PM    Appearance CLOUDY 04/17/2017 09:42 PM    Specific gravity 1.029 04/17/2017 09:42 PM    pH (UA) 5.5 04/17/2017 09:42 PM    Protein 30 04/17/2017 09:42 PM    Glucose NEGATIVE  04/17/2017 09:42 PM    Ketone NEGATIVE  04/17/2017 09:42 PM    Bilirubin NEGATIVE  04/17/2017 09:42 PM    Urobilinogen 1.0 04/17/2017 09:42 PM    Nitrites NEGATIVE  04/17/2017 09:42 PM    Leukocyte Esterase SMALL 04/17/2017 09:42 PM    Epithelial cells FEW 04/17/2017 09:42 PM    Bacteria NEGATIVE  04/17/2017 09:42 PM    WBC 5-10 04/17/2017 09:42 PM    RBC  04/17/2017 09:42 PM         Medications Reviewed:     Current Facility-Administered Medications   Medication Dose Route Frequency    albuterol (PROVENTIL VENTOLIN) nebulizer solution 2.5 mg  2.5 mg Nebulization Q4H PRN    albuterol (PROVENTIL VENTOLIN) nebulizer solution 2.5 mg  2.5 mg Nebulization Q6H RT    oxyCODONE IR (ROXICODONE) tablet 5 mg  5 mg Oral Q4H PRN    albumin human 25% (BUMINATE) solution 12.5 g  12.5 g IntraVENous Q6H    heparin (porcine) injection 5,000 Units  5,000 Units SubCUTAneous Q8H    sodium chloride (NS) flush 5-10 mL  5-10 mL IntraVENous Q8H    sodium chloride (NS) flush 5-10 mL  5-10 mL IntraVENous PRN    acetaminophen (TYLENOL) tablet 650 mg  650 mg Oral Q6H PRN    ondansetron (ZOFRAN) injection 4 mg  4 mg IntraVENous Q8H PRN    0.9% sodium chloride infusion  75 mL/hr IntraVENous CONTINUOUS    cholecalciferol (VITAMIN D3) tablet 1,000 Units  1,000 Units Oral DAILY    doxazosin (CARDURA) tablet 4 mg  4 mg Oral DAILY    ferrous sulfate tablet 325 mg  325 mg Oral ACB    finasteride (PROSCAR) tablet 5 mg  5 mg Oral DAILY    fish oil-omega-3 fatty acids 340-1,000 mg capsule 3 Cap  3 Cap Oral DAILY    folic acid (FOLVITE) tablet 1 mg  1 mg Oral DAILY    guaiFENesin ER (MUCINEX) tablet 600 mg  600 mg Oral BID    methotrexate (RHEUMATREX) tablet 10 mg  10 mg Oral every Monday    metoprolol tartrate (LOPRESSOR) tablet 50 mg  50 mg Oral BID    multivitamin, tx-iron-ca-min (THERA-M w/ IRON) tablet 1 Tab  1 Tab Oral DAILY    arformoterol (BROVANA) neb solution 15 mcg  15 mcg Nebulization BID RT    And    budesonide (PULMICORT) 500 mcg/2 ml nebulizer suspension  500 mcg Nebulization BID RT    pantoprazole (PROTONIX) tablet 40 mg  40 mg Oral ACB    metoprolol (LOPRESSOR) injection 5 mg  5 mg IntraVENous Q6H PRN     ______________________________________________________________________  EXPECTED LENGTH OF STAY: 4d 0h  ACTUAL LENGTH OF STAY: 3                 Raiza Callaway MD

## 2017-04-18 NOTE — ROUTINE PROCESS
TRANSFER - OUT REPORT:    Verbal report given to Chris Nair (name) on Shon Ruiz  being transferred to [address] [place] [address] (unit) for routine progression of care       Report consisted of patients Situation, Background, Assessment and   Recommendations(SBAR). Information from the following report(s) Procedure Summary was reviewed with the receiving nurse. Lines:   Peripheral IV 04/16/17 Right Antecubital (Active)   Site Assessment Clean, dry, & intact 4/18/2017  9:00 AM   Phlebitis Assessment 0 4/18/2017  9:00 AM   Infiltration Assessment 0 4/18/2017  9:00 AM   Dressing Status Clean, dry, & intact 4/18/2017  9:00 AM   Dressing Type Transparent 4/18/2017  9:00 AM   Hub Color/Line Status Pink 4/18/2017  9:00 AM   Action Taken Open ports on tubing capped 4/18/2017  9:00 AM   Alcohol Cap Used Yes 4/18/2017  4:00 AM        Opportunity for questions and clarification was provided. Paracentesis procedure cancelled per Dr. Josiah Hill, patient does not have enough fluid.     Patient transported with:   O2 @ 2 liters

## 2017-04-18 NOTE — PROGRESS NOTES
Hospitalist Progress Note  Sara Prince MD  Office: [phone]        Date of Service:2017  NAME:  Julieth Washington  :  1929  MRN:  [id number]      Admission Summary:   80year old male with history of prior DVT/PE, RA, HTN, BPH, pAfib presented on 4/15/17, transferred from [facility] due to abnormal findings on CT Abd/Pelvis. He had initially presented on [facility] ED due to progressive abdominal distention and dyspnea x 5-6 weeks. He was found to have numerous large solid masses scattered throughout his liver, suspicious for hepatic metastatic disease. Interval history / Subjective:      Breathing better today. Daughter at bedside. Assessment & Plan:         1. 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   - CT chest 4/15 - No evidence of pulmonary nodule or mass. Innumerable large hepatic masses, compatible with metastatic disease. Diffuse peritoneal carcinomatosis. Diffuse gastric wall thickening may represent peritoneal implants or primary gastric malignancy. Small ascites. Small hiatal hernia. Mild centrilobular emphysema. - IR consulted - s/p need biopsy,   -Consulted oncology for some guidance,he may not be good candidate for any therapy. Patient and daughter aware and they are realistic. 2.  Dyspnea,multifactorial largely due to abdominal distension,was wheezing as well   - COPD,abdominal distension  -Start steroids,he is wheezing  -abdominal US showed significant ascites  -Therapeutic paracentesis requested. 3. pAFib   - rate controlled on home metoprolol   - holding Xarelto for possible IR biopsy    4. History of DVT/PE   - s/p IVC filter, on Xarelto,resumed  -Right LE doppler + for acute DVT    5.   BPH   - continue home finasteride, doxazosin   - Teague placed due to urinary retention, likely void trial tomorrow    6. RA   - resume home methotrexate, folic acid    7. HTN   - he is borderline low-normal BPs   - continue metoprolol   - hold losartan, spironolactone    8. GERD   - on Protonix    9. Leukocytosis   - suspect this is secondary to underlying malignancy / dehydration  -Worsened with steroids. 10.Mild hyperkalemia: kayexalate        Code status: DNR  DVT prophylaxis: Lovenox    Care Plan discussed with: discussed with patient and daughter who is an RN. Disposition: TBD     Hospital Problems  Date Reviewed: 4/15/2017          Codes Class Noted POA    * (Principal)Cancer [facility]) ICD-10-CM: C80.1  ICD-9-CM: 199.1  4/15/2017 Unknown        DVT (deep vein thrombosis) in pregnancy ([facility] (Chronic) ICD-10-CM: O22.30, I82.409  ICD-9-CM: 671.30  4/15/2017 Yes        A-fib ([facility] (Chronic) ICD-10-CM: I48.91  ICD-9-CM: 427.31  4/15/2017 Yes        HTN (hypertension) (Chronic) ICD-10-CM: I10  ICD-9-CM: 401.9  4/15/2017 Yes        Pulmonary embolism (HCC) (Chronic) ICD-10-CM: I26.99  ICD-9-CM: 415.19  4/15/2017 Yes        BPH (benign prostatic hyperplasia) (Chronic) ICD-10-CM: N40.0  ICD-9-CM: 600.00  4/15/2017 Yes                Review of Systems:   A comprehensive review of systems was negative except for that written in the HPI. Vital Signs:    Last 24hrs VS reviewed since prior progress note. Most recent are:  Visit Vitals    /58    Pulse 87    Temp 98.2 °F (36.8 °C)    Resp 20    Ht 5' 5\" (1.651 m)    Wt 82.8 kg (182 lb 8.7 oz)    SpO2 97%    BMI 30.38 kg/m2       No intake or output data in the 24 hours ending 04/18/17 1407     Physical Examination:             Constitutional:  No acute distress, cooperative, pleasant    ENT:  Oral mucous moist, oropharynx benign. Neck supple,    Resp:  Diffuse wheezing    CV:  Regular rhythm, normal rate, no murmurs, gallops, rubs    GI:  Soft, distended with nodularity in RUQ, non tender.  normoactive bowel sounds, hepatomegaly    Musculoskeletal: Bilateral asymmetric lower extremity edema, at least 1+ pitting R > L, warm, 2+ pulses throughout    Neurologic:  Moves all extremities. AAOx3, CN II-XII reviewed     Skin:  overlying chronic brawny skin changes in bilateral lower extremities, more prominent on R compared to L       Data Review:    Review and/or order of clinical lab test      Labs:     Recent Labs      04/18/17 0128 04/17/17 0251   WBC  25.2*  20.8*   HGB  9.6*  9.0*   HCT  30.2*  28.3*   PLT  413*  437*     Recent Labs      04/18/17   0128  04/17/17   0251  04/15/17   2020   NA  137  136  135*   K  5.4*  5.0  5.2*   CL  106  106  104   CO2  21  22  24   BUN  38*  35*  44*   CREA  1.37*  0.98  1.23   GLU  150*  92  112*   CA  8.8  8.7  9.2     Recent Labs      04/15/17   2020   SGOT  77*   ALT  40   AP  696*   TBILI  0.4   TP  5.7*   ALB  2.4*   GLOB  3.3     Recent Labs      04/15/17   2020   INR  1.9*   PTP  19.4*      No results for input(s): FE, TIBC, PSAT, FERR in the last 72 hours. No results found for: FOL, RBCF   No results for input(s): PH, PCO2, PO2 in the last 72 hours. No results for input(s): CPK, CKNDX, TROIQ in the last 72 hours.     No lab exists for component: CPKMB  No results found for: CHOL, CHOLX, CHLST, CHOLV, HDL, LDL, DLDL, LDLC, DLDLP, TGL, TGLX, TRIGL, TRIGP, CHHD, CHHDX  No results found for: [facility]  Lab Results   Component Value Date/Time    Color YELLOW/STRAW 04/17/2017 09:42 PM    Appearance CLOUDY 04/17/2017 09:42 PM    Specific gravity 1.029 04/17/2017 09:42 PM    pH (UA) 5.5 04/17/2017 09:42 PM    Protein 30 04/17/2017 09:42 PM    Glucose NEGATIVE  04/17/2017 09:42 PM    Ketone NEGATIVE  04/17/2017 09:42 PM    Bilirubin NEGATIVE  04/17/2017 09:42 PM    Urobilinogen 1.0 04/17/2017 09:42 PM    Nitrites NEGATIVE  04/17/2017 09:42 PM    Leukocyte Esterase SMALL 04/17/2017 09:42 PM    Epithelial cells FEW 04/17/2017 09:42 PM    Bacteria NEGATIVE  04/17/2017 09:42 PM    WBC 5-10 04/17/2017 09:42 PM    RBC  04/17/2017 09:42 PM         Medications Reviewed:     Current Facility-Administered Medications   Medication Dose Route Frequency    enoxaparin (LOVENOX) injection 80 mg  1 mg/kg SubCUTAneous Q12H    albuterol (PROVENTIL VENTOLIN) nebulizer solution 2.5 mg  2.5 mg Nebulization Q4H PRN    albuterol (PROVENTIL VENTOLIN) nebulizer solution 2.5 mg  2.5 mg Nebulization Q6H RT    oxyCODONE IR (ROXICODONE) tablet 5 mg  5 mg Oral Q4H PRN    albumin human 25% (BUMINATE) solution 12.5 g  12.5 g IntraVENous Q6H    sodium chloride (NS) flush 5-10 mL  5-10 mL IntraVENous Q8H    sodium chloride (NS) flush 5-10 mL  5-10 mL IntraVENous PRN    acetaminophen (TYLENOL) tablet 650 mg  650 mg Oral Q6H PRN    ondansetron (ZOFRAN) injection 4 mg  4 mg IntraVENous Q8H PRN    0.9% sodium chloride infusion  75 mL/hr IntraVENous CONTINUOUS    cholecalciferol (VITAMIN D3) tablet 1,000 Units  1,000 Units Oral DAILY    doxazosin (CARDURA) tablet 4 mg  4 mg Oral DAILY    ferrous sulfate tablet 325 mg  325 mg Oral ACB    finasteride (PROSCAR) tablet 5 mg  5 mg Oral DAILY    fish oil-omega-3 fatty acids 340-1,000 mg capsule 3 Cap  3 Cap Oral DAILY    folic acid (FOLVITE) tablet 1 mg  1 mg Oral DAILY    guaiFENesin ER (MUCINEX) tablet 600 mg  600 mg Oral BID    methotrexate (RHEUMATREX) tablet 10 mg  10 mg Oral every Monday    metoprolol tartrate (LOPRESSOR) tablet 50 mg  50 mg Oral BID    multivitamin, tx-iron-ca-min (THERA-M w/ IRON) tablet 1 Tab  1 Tab Oral DAILY    arformoterol (BROVANA) neb solution 15 mcg  15 mcg Nebulization BID RT    And    budesonide (PULMICORT) 500 mcg/2 ml nebulizer suspension  500 mcg Nebulization BID RT    pantoprazole (PROTONIX) tablet 40 mg  40 mg Oral ACB    metoprolol (LOPRESSOR) injection 5 mg  5 mg IntraVENous Q6H PRN     ______________________________________________________________________  EXPECTED LENGTH OF STAY: 4d 0h  ACTUAL LENGTH OF STAY:          3                 Denis Mejia, MD

## 2017-04-18 NOTE — INTERDISCIPLINARY ROUNDS
IDR/SLIDR Summary          Patient: Ralf Galloway MRN: [id number]    Age: 80 y.o. YOB: 1929 Room/Bed: [facility]   Admit Diagnosis: Mestatic Liver Cancer  Cancer ([facility]  Principal Diagnosis: Cancer ([facility]   Goals: Discharge planning, paracentetesis of abd  Readmission: NO  Quality Measure: Not applicable  VTE Prophylaxis: Chemical  Influenza Vaccine screening completed? YES  Pneumococcal Vaccine screening completed? NO  Mobility needs: Yes   Nutrition plan:Yes  Consults:P.T, O.T., Respiratory and Case Management    Financial concerns:No Escalated to CM? YES  RRAT Score: 12  Interventions:Home Health, Palliative Care , COPD Educator to follow  and [facility]   Testing due for pt today?  YES  LOS: 3 days Expected length of stay 5 days  Discharge plan: SNF vs Home  PCP: Irma Plata MD  Transportation needs: No    Days before discharge:two or more days before discharge   Discharge disposition: Rehab    Signed:     Leanne Wray  4/18/2017  4:02 AM

## 2017-04-18 NOTE — PROGRESS NOTES
Bedside shift change report given to Sunitha Curiel (oncoming nurse) by Leonardo Douglas (offgoing nurse). Report included the following information SBAR, Kardex, MAR, Accordion, Recent Results, Cardiac Rhythm A Fib and Alarm Parameters .

## 2017-04-19 ENCOUNTER — HOSPICE ADMISSION (OUTPATIENT)
Dept: HOSPICE | Facility: HOSPICE | Age: 82
End: 2017-04-19

## 2017-04-19 LAB
BACTERIA SPEC CULT: NORMAL
CC UR VC: NORMAL
SERVICE CMNT-IMP: NORMAL

## 2017-04-19 PROCEDURE — 51798 US URINE CAPACITY MEASURE: CPT

## 2017-04-19 PROCEDURE — 77030029684 HC NEB SM VOL KT MONA -A

## 2017-04-19 PROCEDURE — 74011250637 HC RX REV CODE- 250/637: Performed by: INTERNAL MEDICINE

## 2017-04-19 PROCEDURE — 74011250637 HC RX REV CODE- 250/637: Performed by: HOSPITALIST

## 2017-04-19 PROCEDURE — 94640 AIRWAY INHALATION TREATMENT: CPT

## 2017-04-19 PROCEDURE — 74011000250 HC RX REV CODE- 250: Performed by: INTERNAL MEDICINE

## 2017-04-19 PROCEDURE — 77010033678 HC OXYGEN DAILY

## 2017-04-19 PROCEDURE — 74011000250 HC RX REV CODE- 250: Performed by: HOSPITALIST

## 2017-04-19 PROCEDURE — 65660000000 HC RM CCU STEPDOWN

## 2017-04-19 PROCEDURE — 74011250636 HC RX REV CODE- 250/636: Performed by: HOSPITALIST

## 2017-04-19 RX ADMIN — ALBUTEROL SULFATE 2.5 MG: 2.5 SOLUTION RESPIRATORY (INHALATION) at 04:33

## 2017-04-19 RX ADMIN — Medication 10 ML: at 06:08

## 2017-04-19 RX ADMIN — VITAMIN D, TAB 1000IU (100/BT) 1000 UNITS: 25 TAB at 09:00

## 2017-04-19 RX ADMIN — METOPROLOL TARTRATE 50 MG: 50 TABLET ORAL at 09:21

## 2017-04-19 RX ADMIN — GUAIFENESIN 600 MG: 600 TABLET, EXTENDED RELEASE ORAL at 09:00

## 2017-04-19 RX ADMIN — ALBUTEROL SULFATE 2.5 MG: 2.5 SOLUTION RESPIRATORY (INHALATION) at 11:39

## 2017-04-19 RX ADMIN — ALBUTEROL SULFATE 2.5 MG: 2.5 SOLUTION RESPIRATORY (INHALATION) at 00:53

## 2017-04-19 RX ADMIN — Medication 10 ML: at 14:40

## 2017-04-19 RX ADMIN — BUDESONIDE 500 MCG: 0.5 INHALANT RESPIRATORY (INHALATION) at 21:39

## 2017-04-19 RX ADMIN — SODIUM CHLORIDE 75 ML/HR: 900 INJECTION, SOLUTION INTRAVENOUS at 11:36

## 2017-04-19 RX ADMIN — ARFORMOTEROL TARTRATE 15 MCG: 15 SOLUTION RESPIRATORY (INHALATION) at 21:39

## 2017-04-19 RX ADMIN — ALBUTEROL SULFATE 2.5 MG: 2.5 SOLUTION RESPIRATORY (INHALATION) at 19:26

## 2017-04-19 RX ADMIN — RIVAROXABAN 20 MG: 20 TABLET, FILM COATED ORAL at 16:59

## 2017-04-19 RX ADMIN — FINASTERIDE 5 MG: 5 TABLET, FILM COATED ORAL at 09:20

## 2017-04-19 RX ADMIN — ARFORMOTEROL TARTRATE 15 MCG: 15 SOLUTION RESPIRATORY (INHALATION) at 08:23

## 2017-04-19 RX ADMIN — BUDESONIDE 500 MCG: 0.5 INHALANT RESPIRATORY (INHALATION) at 08:23

## 2017-04-19 RX ADMIN — ALBUTEROL SULFATE 2.5 MG: 2.5 SOLUTION RESPIRATORY (INHALATION) at 08:00

## 2017-04-19 RX ADMIN — METOPROLOL TARTRATE 50 MG: 50 TABLET ORAL at 17:00

## 2017-04-19 RX ADMIN — FOLIC ACID 1 MG: 1 TABLET ORAL at 09:21

## 2017-04-19 RX ADMIN — FERROUS SULFATE TAB 325 MG (65 MG ELEMENTAL FE) 325 MG: 325 (65 FE) TAB at 07:08

## 2017-04-19 RX ADMIN — PANTOPRAZOLE SODIUM 40 MG: 40 TABLET, DELAYED RELEASE ORAL at 07:08

## 2017-04-19 RX ADMIN — ALBUTEROL SULFATE 2.5 MG: 2.5 SOLUTION RESPIRATORY (INHALATION) at 13:55

## 2017-04-19 RX ADMIN — DOXAZOSIN 4 MG: 2 TABLET ORAL at 09:21

## 2017-04-19 RX ADMIN — Medication 3 CAPSULE: at 09:20

## 2017-04-19 RX ADMIN — MULTIPLE VITAMINS W/ MINERALS TAB 1 TABLET: TAB at 09:21

## 2017-04-19 NOTE — INTERDISCIPLINARY ROUNDS
IDR/SLIDR Summary          Patient: Shon Ruiz MRN: [id number] Age: 80 y.o. YOB: 1929 Room/Bed: [facility]   Admit Diagnosis: Mestatic Liver Cancer  Cancer ([facility]  Principal Diagnosis: Cancer ([facility]   Goals: Discharge planning, paracentetesis of abd  Readmission: NO  Quality Measure: Not applicable  VTE Prophylaxis: Chemical  Influenza Vaccine screening completed? YES  Pneumococcal Vaccine screening completed? NO  Mobility needs: Yes   Nutrition plan:Yes  Consults:P.T, O.T., Respiratory and Case Management    Financial concerns:No Escalated to CM? YES  RRAT Score: 12  Interventions:Home Health, Palliative Care , COPD Educator to follow and [facility]   Testing due for pt today?  No  LOS: 4 days Expected length of stay 5 days  Discharge plan: SNF vs Home  PCP: Laure Navarro MD  Transportation needs: No    Days before discharge:two or more days before discharge   Discharge disposition: Rehab    Signed:     Gab Vegas  4/18/2017  11:54 PM

## 2017-04-19 NOTE — PROGRESS NOTES
Hematology/Oncology Progress Note    REASON FOR VISIT: Metastatic cancer, unknown primary    HISTORY OF PRESENT ILLNESS: Mr. Sujatha Telles is a 80 y.o. male who we are asked to see in consultation for newly found liver lesions with unknown primary. Mr. Sujatha Telles came to the ED on 4/16/17 with progressive shortness of breath and abdominal distention. These symptoms began about 3 months ago and shortness of breath significantly worsened on recent trip to [place] with family. He states he knew that he should not have gone on the trip, but did not want to miss out with family. While on vacation, he spent most days in the hotel due to dyspnea. He was able to walk about 10 steps before he would have to stop and rest. He states they took taxis for one block when he was out of the hotel. He has also been experiencing increasing fatigue and unintentional weight loss. History significant for [place], BPH, HTN, RA on Methotrexate, DVT/PE with IVC filter and on xarelto, and atrial fibrillation. He has no personal history of cancer. Former smoker, quit in the 1960's. Denies fevers, chills, nausea, vomiting, diarrhea, hematuria, or recent falls. CT Chest obtained without evidence of pulmonary nodule or mass. There are innumerable large hepatic masses consistent with metastatic disease. Diffuse peritoneal carcinomatosis. Diffuse gastric wall thickening which may represent gastric primary. CT guided biopsy of the liver obtained yesterday. KUB without evidence of bowel obstruction. Ultrasound with simple ascites. INTERVAL HISTORY: Patient is resting in bed. Breathing looks comfortable. Denies pain. Dr. Dhaval Alonzo at the bedside as well. Patient would like to go home with hospice. Will assist in coordinating today. No past medical history on file. No past surgical history on file.     No Known Allergies    Current Facility-Administered Medications   Medication Dose Route Frequency Provider Last Rate Last Dose    rivaroxaban (XARELTO) tablet 20 mg  20 mg Oral DAILY WITH DINNER Denis Mejia MD   20 mg at 04/18/17 1728    albuterol (PROVENTIL VENTOLIN) nebulizer solution 2.5 mg  2.5 mg Nebulization Q4H PRN Denis Mejia MD   2.5 mg at 04/19/17 1139    albuterol (PROVENTIL VENTOLIN) nebulizer solution 2.5 mg  2.5 mg Nebulization Q6H RT Denis Mejia MD   2.5 mg at 04/19/17 0800    oxyCODONE IR (ROXICODONE) tablet 5 mg  5 mg Oral Q4H PRN Denis Mejia MD   5 mg at 04/17/17 1602    sodium chloride (NS) flush 5-10 mL  5-10 mL IntraVENous Q8H Miranda Carlin, DO   10 mL at 04/19/17 0608    sodium chloride (NS) flush 5-10 mL  5-10 mL IntraVENous PRN Benjamin Anderson, DO   10 mL at 04/16/17 0849    acetaminophen (TYLENOL) tablet 650 mg  650 mg Oral Q6H PRN Benjamin Anderson, DO        ondansetron [facility]) injection 4 mg  4 mg IntraVENous Q8H PRN Benjamin Anderson, DO        0.9% sodium chloride infusion  75 mL/hr IntraVENous CONTINUOUS Denis Mejia MD 75 mL/hr at 04/19/17 1136 75 mL/hr at 04/19/17 1136    cholecalciferol (VITAMIN D3) tablet 1,000 Units  1,000 Units Oral DAILY Benjamin Anderson, DO   1,000 Units at 04/19/17 0900    doxazosin (CARDURA) tablet 4 mg  4 mg Oral DAILY Miranda Carlin, DO   4 mg at 04/19/17 0320    ferrous sulfate tablet 325 mg  325 mg Oral ACB Miranda Carlin, DO   325 mg at 04/19/17 0708    finasteride (PROSCAR) tablet 5 mg  5 mg Oral DAILY Miranda Carlin, DO   5 mg at 04/19/17 0920    fish oil-omega-3 fatty acids 340-1,000 mg capsule 3 Cap  3 Cap Oral DAILY Miranda Carlin, DO   3 Cap at 21/34/29 3741    folic acid (FOLVITE) tablet 1 mg  1 mg Oral DAILY Miranda Carlin DO   1 mg at 04/19/17 3993    guaiFENesin ER (MUCINEX) tablet 600 mg  600 mg Oral BID Benjamin Anderson DO   600 mg at 04/19/17 0900    methotrexate (RHEUMATREX) tablet 10 mg  10 mg Oral every Monday Benjamin Anderson DO   10 mg at 04/17/17 4813    metoprolol tartrate (LOPRESSOR) tablet 50 mg  50 mg Oral BID Pia Drafts, DO   50 mg at 04/19/17 7955    multivitamin, tx-iron-ca-min (THERA-M w/ IRON) tablet 1 Tab  1 Tab Oral DAILY Pia Drafts, DO   1 Tab at 04/19/17 6075    arformoterol (BROVANA) neb solution 15 mcg  15 mcg Nebulization BID RT Mirandaroman Dineroman, DO   15 mcg at 04/19/17 1682    And    budesonide (PULMICORT) 500 mcg/2 ml nebulizer suspension  500 mcg Nebulization BID RT Miranda P Raegan, DO   500 mcg at 04/19/17 8387    pantoprazole (PROTONIX) tablet 40 mg  40 mg Oral ACB Pia Drafts, DO   40 mg at 04/19/17 0708    metoprolol (LOPRESSOR) injection 5 mg  5 mg IntraVENous Q6H PRN Pia Drafts, DO   5 mg at 04/17/17 0620       Social History     Social History    Marital status:      Spouse name: N/A    Number of children: N/A    Years of education: N/A     Social History Main Topics    Smoking status: Not on file    Smokeless tobacco: Not on file    Alcohol use Not on file    Drug use: Not on file    Sexual activity: Not on file     Other Topics Concern    Not on file     Social History Narrative       No family history on file. ROS  As per the HPI, otherwise a comprehensive ROS is negative. ECOG PS is 3. Emotional well being addressed and patient is coping well. Physical Examination:   Visit Vitals    /54 (BP 1 Location: Left arm, BP Patient Position: At rest)    Pulse 83    Temp 98.4 °F (36.9 °C)    Resp 24    Ht 5' 5\" (1.651 m)    Wt 183 lb 3.2 oz (83.1 kg)    SpO2 98%    BMI 30.49 kg/m2     General appearance - alert, elderly male with dyspnea during conversation  Mental status - oriented to person, place, and time  Neurological - normal speech, no focal findings or movement disorder noted    Rest of exam deferred.     LABS  Lab Results   Component Value Date/Time    WBC 25.2 04/18/2017 01:28 AM    HGB 9.6 04/18/2017 01:28 AM    HCT 30.2 04/18/2017 01:28 AM PLATELET 788 52/19/7845 01:28 AM    MCV 76.5 04/18/2017 01:28 AM    ABS. NEUTROPHILS 24.4 04/18/2017 01:28 AM     Lab Results   Component Value Date/Time    Sodium 137 04/18/2017 11:00 PM    Potassium 5.2 04/18/2017 11:00 PM    Chloride 107 04/18/2017 11:00 PM    CO2 21 04/18/2017 11:00 PM    Glucose 106 04/18/2017 11:00 PM    BUN 36 04/18/2017 11:00 PM    Creatinine 1.11 04/18/2017 11:00 PM    GFR est AA >60 04/18/2017 11:00 PM    GFR est non-AA >60 04/18/2017 11:00 PM    Calcium 9.1 04/18/2017 11:00 PM     Lab Results   Component Value Date/Time    AST (SGOT) 77 04/15/2017 08:20 PM    Alk. phosphatase 696 04/15/2017 08:20 PM    Protein, total 5.7 04/15/2017 08:20 PM    Albumin 2.4 04/15/2017 08:20 PM    Globulin 3.3 04/15/2017 08:20 PM    A-G Ratio 0.7 04/15/2017 08:20 PM       PATHOLOGY  [facility] 4/17/17 - metastatic adenocarcinoma, final path pending    IMAGING  CT Results (most recent):    Results from [place] encounter on 04/15/17   CT BX LIVER NDL PERC   Narrative EXAM:  CT BX LIVER NDL PERC    INDICATION:   Diffuse liver masses and peritoneal carcinomatosis suspicious for  neoplasm with unknown primary. CT dose reduction was achieved through use of a standardized protocol tailored  for this examination and automatic exposure control for dose modulation. FINDINGS: The procedure including the risk of bleeding and infection was  explained to the patient and verbal and written informed consent was obtained. The patient was placed into the CT scanner in the supine position, images were  obtained and a site was localized for biopsy of a large mass in the inferior  right lobe of liver. On the  images there is redemonstrated peritoneal  carcinomatosis and there is an appearance of thickening of the gastric wall  diffusely along the major curvature. The skin was marked and prepped and draped in sterile fashion and anesthetized  with 1% lidocaine.  A 20-gauge Gewara coaxial biopsy system was utilized. The  introducer needle was advanced to the margin of the mass and through this 3 core  biopsy specimens were obtained which appeared scan and were reported is mostly  necrotic tissue by the cytopathologist. [address] [facility] final aspirations  within performed through the trocar needle which yielded better material in the  first sample with the second 2 samples placed directly in formalin. Finally, 2  additional 21 age Temno core biopsies were obtained and placed directly in  formalin which appeared visually more substantial than the prior score biopsies. The pathologist was present and confirmed probable adequacy of the tissue  sample. Touch preparations were made. The needle was removed and a bandage  applied. The patient was monitored by the radiology nurse throughout the procedure with  pulse oximetry and EKG monitoring and Versed and fentanyl were administered for  conscious sedation. The patient tolerated the procedure well without apparent  complication and will be recovered in the radiology holding unit and discharged  to home if stable. Impression IMPRESSION: CT guided right lobe liver mass biopsy performed. Peritoneal  carcinomatosis and diffuse thickening of the greater curvature of the gastric  wall suggestive of gastric involvement and raising question of gastric carcinoma  or lymphoma. Pathology pending. ASSESSMENT  Mr. Desiree Frye is a 80 y.o. male who is admitted with newly diagnosed metastatic adenocarcinoma of unknown primary with multiple co-morbidities. DISCUSSION/PLAN  1. Metastatic Adenocarcinoma to the Liver. Noted on CT scans. Unknown primary at this time. No evidence of disease in the lung. Final pathology is still pending. States today that he wishes to go home and does not want any treatment. He would like to be out of the Hospital and have his daughter, Kenzie Starks who is an RN, care for him. Discussed Hospice care and what this would entail.  Discussed that Hospice would increase support in the home for both himself and his family. He wishes to pursue their services. Discussed with sister at the bedside (not [place]). Consult placed to Hospice. We will continue to support as needed during hospitalization. 2. Ascites. Related to malignancy. Unable to have paracentesis yesterday, not enough fluid to tap. 3. Dyspnea. Related to #2. Slightly improved today per patient. Pt seen today in conjunction with NP YONATAN Rodriguez  Please call with any questions.     Wilma Luna, DO

## 2017-04-19 NOTE — PROGRESS NOTES
Bedside and Verbal shift change report given to Neelima Palma RN (oncoming nurse) by Mauricio Johnson RN (offgoing nurse). Report included the following information SBAR, Kardex, MAR, Recent Results and Cardiac Rhythm Afib.

## 2017-04-19 NOTE — PROGRESS NOTES
Hospitalist Progress Note  Greg Riley MD  Office: [phone]        Date of Service:2017  NAME:  Aggie Jay  :  1929  MRN:  [id number]      Admission Summary:   80year old male with history of prior DVT/PE, RA, HTN, BPH, pAfib presented on 4/15/17, transferred from [place] due to abnormal findings on CT Abd/Pelvis. He had initially presented on [place] ED due to progressive abdominal distention and dyspnea x 5-6 weeks. He was found to have numerous large solid masses scattered throughout his liver, suspicious for hepatic metastatic disease. Interval history / Subjective:      Feels okay. Would like to go home    Assessment & Plan:         1. 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   - CT chest 4/15 - No evidence of pulmonary nodule or mass. Innumerable large hepatic masses, compatible with metastatic disease. Diffuse peritoneal carcinomatosis. Diffuse gastric wall thickening may represent peritoneal implants or primary gastric malignancy. Small ascites. Small hiatal hernia. Mild centrilobular emphysema.   - s/p need biopsy, ,+for adenoarcinoma  -Patient and daughter would not want any aggressive measures, oncology suggested hospice . Hospice consulted. 2.  Dyspnea,multifactorial largely due to abdominal distension,was wheezing as well   - COPD,abdominal distension  -Start steroids,he is wheezing  -abdominal US showed significant ascites  -Therapeutic paracentesis requested,radiologist called and reported that there is not enough fluid to drain. 3. pAFib   - rate controlled on home metoprolol   - holding Xarelto for possible IR biopsy    4. History of DVT/PE   - s/p IVC filter, on Xarelto,resumed  -Right LE doppler + for acute DVT    5. BPH   - continue home finasteride, doxazosin   -Voiding trial    6. RA   - resume home methotrexate, folic acid    7. HTN   - he is borderline low-normal BPs   - continue metoprolol   - hold losartan, spironolactone    8. GERD   - on Protonix    9. Leukocytosis   - suspect this is secondary to underlying malignancy / dehydration  -Worsened with steroids. 10.Mild hyperkalemia: kayexalate        Code status: DNR  On [address] [facility] discussed with: discussed with patient and daughter who is an RN. Disposition: Anticipate home with hospice services. Hospital Problems  Date Reviewed: 4/15/2017          Codes Class Noted POA    * (Principal)Cancer [facility]) ICD-10-CM: C80.1  ICD-9-CM: 199.1  4/15/2017 Unknown        DVT (deep vein thrombosis) in pregnancy ([facility] [address] (Chronic) ICD-10-CM: O22.30, I82.409  ICD-9-CM: 671.30  4/15/2017 Yes        A-fib ([facility] [address] [address] (Chronic) ICD-10-CM: I48.91  ICD-9-CM: 427.31  4/15/2017 Yes        HTN (hypertension) (Chronic) ICD-10-CM: I10  ICD-9-CM: 401.9  4/15/2017 Yes        Pulmonary embolism (HCC) (Chronic) ICD-10-CM: I26.99  ICD-9-CM: 415.19  4/15/2017 Yes        BPH (benign prostatic hyperplasia) (Chronic) ICD-10-CM: N40.0  ICD-9-CM: 600.00  4/15/2017 Yes                Review of Systems:   A comprehensive review of systems was negative except for that written in the HPI. Vital Signs:    Last 24hrs VS reviewed since prior progress note. Most recent are:  Visit Vitals    /52    Pulse (!) 113    Temp 98.6 °F (37 °C)    Resp 20    Ht 5' 5\" (1.651 m)    Wt 83.1 kg (183 lb 3.2 oz)    SpO2 95%    BMI 30.49 kg/m2         Intake/Output Summary (Last 24 hours) at 04/19/17 1108  Last data filed at 04/19/17 0800   Gross per 24 hour   Intake                0 ml   Output             2000 ml   Net            -2000 ml        Physical Examination:             Constitutional:  No acute distress, cooperative, pleasant    ENT:  Oral mucous moist, oropharynx benign.  Neck supple,    Resp:  Diffuse wheezing    CV:  Regular rhythm, normal rate, no murmurs, gallops, rubs    GI:  Soft, distended with nodularity in RUQ, non tender. normoactive bowel sounds, hepatomegaly    Musculoskeletal:  Bilateral asymmetric lower extremity edema, at least 1+ pitting R > L, warm, 2+ pulses throughout    Neurologic:  Moves all extremities. AAOx3, CN II-XII reviewed     Skin:  overlying chronic brawny skin changes in bilateral lower extremities, more prominent on R compared to L       Data Review:    Review and/or order of clinical lab test      Labs:     Recent Labs      04/18/17   0128  04/17/17   0251   WBC  25.2*  20.8*   HGB  9.6*  9.0*   HCT  30.2*  28.3*   PLT  413*  437*     Recent Labs      04/18/17   2300  04/18/17   0128  04/17/17   0251   NA  137  137  136   K  5.2*  5.4*  5.0   CL  107  106  106   CO2  21  21  22   BUN  36*  38*  35*   CREA  1.11  1.37*  0.98   GLU  106*  150*  92   CA  9.1  8.8  8.7     No results for input(s): SGOT, GPT, ALT, AP, TBIL, TBILI, TP, ALB, GLOB, GGT, AML, LPSE in the last 72 hours. No lab exists for component: AMYP, HLPSE  No results for input(s): INR, PTP, APTT in the last 72 hours. No lab exists for component: INREXT, INREXT   No results for input(s): FE, TIBC, PSAT, FERR in the last 72 hours. No results found for: FOL, RBCF   No results for input(s): PH, PCO2, PO2 in the last 72 hours. No results for input(s): CPK, CKNDX, TROIQ in the last 72 hours.     No lab exists for component: CPKMB  No results found for: CHOL, CHOLX, CHLST, CHOLV, HDL, LDL, DLDL, LDLC, DLDLP, TGL, TGLX, TRIGL, TRIGP, CHHD, CHHDX  No results found for: [facility]  Lab Results   Component Value Date/Time    Color YELLOW/STRAW 04/17/2017 09:42 PM    Appearance CLOUDY 04/17/2017 09:42 PM    Specific gravity 1.029 04/17/2017 09:42 PM    pH (UA) 5.5 04/17/2017 09:42 PM    Protein 30 04/17/2017 09:42 PM    Glucose NEGATIVE  04/17/2017 09:42 PM    Ketone NEGATIVE  04/17/2017 09:42 PM    Bilirubin NEGATIVE  04/17/2017 09:42 PM    Urobilinogen 1.0 04/17/2017 09:42 PM    Nitrites NEGATIVE  04/17/2017 09:42 PM    Leukocyte Esterase SMALL 04/17/2017 09:42 PM    Epithelial cells FEW 04/17/2017 09:42 PM    Bacteria NEGATIVE  04/17/2017 09:42 PM    WBC 5-10 04/17/2017 09:42 PM    RBC  04/17/2017 09:42 PM         Medications Reviewed:     Current Facility-Administered Medications   Medication Dose Route Frequency    rivaroxaban (XARELTO) tablet 20 mg  20 mg Oral DAILY WITH DINNER    albuterol (PROVENTIL VENTOLIN) nebulizer solution 2.5 mg  2.5 mg Nebulization Q4H PRN    albuterol (PROVENTIL VENTOLIN) nebulizer solution 2.5 mg  2.5 mg Nebulization Q6H RT    oxyCODONE IR (ROXICODONE) tablet 5 mg  5 mg Oral Q4H PRN    sodium chloride (NS) flush 5-10 mL  5-10 mL IntraVENous Q8H    sodium chloride (NS) flush 5-10 mL  5-10 mL IntraVENous PRN    acetaminophen (TYLENOL) tablet 650 mg  650 mg Oral Q6H PRN    ondansetron (ZOFRAN) injection 4 mg  4 mg IntraVENous Q8H PRN    0.9% sodium chloride infusion  75 mL/hr IntraVENous CONTINUOUS    cholecalciferol (VITAMIN D3) tablet 1,000 Units  1,000 Units Oral DAILY    doxazosin (CARDURA) tablet 4 mg  4 mg Oral DAILY    ferrous sulfate tablet 325 mg  325 mg Oral ACB    finasteride (PROSCAR) tablet 5 mg  5 mg Oral DAILY    fish oil-omega-3 fatty acids 340-1,000 mg capsule 3 Cap  3 Cap Oral DAILY    folic acid (FOLVITE) tablet 1 mg  1 mg Oral DAILY    guaiFENesin ER (MUCINEX) tablet 600 mg  600 mg Oral BID    methotrexate (RHEUMATREX) tablet 10 mg  10 mg Oral every Monday    metoprolol tartrate (LOPRESSOR) tablet 50 mg  50 mg Oral BID    multivitamin, tx-iron-ca-min (THERA-M w/ IRON) tablet 1 Tab  1 Tab Oral DAILY    arformoterol (BROVANA) neb solution 15 mcg  15 mcg Nebulization BID RT    And    budesonide (PULMICORT) 500 mcg/2 ml nebulizer suspension  500 mcg Nebulization BID RT    pantoprazole (PROTONIX) tablet 40 mg  40 mg Oral ACB    metoprolol (LOPRESSOR) injection 5 mg  5 mg IntraVENous Q6H PRN ______________________________________________________________________  EXPECTED LENGTH OF STAY: 4d 0h  ACTUAL LENGTH OF STAY:          4                 Sekou Chowdhury MD

## 2017-04-19 NOTE — PROGRESS NOTES
Heme/ONC  Met with pt this am  Reviewed dx of metastatic adenocarcinoma  Primary site not clear and markers pending  Pt does not want any chemo or treatment  Wants to go home  We discussed hospice and would recommend hospice  Call if questions [phone]

## 2017-04-19 NOTE — PROGRESS NOTES
Bedside shift change report given to Beatrice Helton (oncoming nurse) by Kia Quintanilla (offgoing nurse). Report included the following information SBAR, Kardex, MAR, Accordion, Recent Results and Alarm Parameters .

## 2017-04-19 NOTE — PROGRESS NOTES
Radiologist reported there is not enough fluid to warrant therapeutic paracentesis. US yesterday was reported as showing significant ascites

## 2017-04-19 NOTE — PROGRESS NOTES
Spiritual Care Assessment/Progress Notes    Silvana Luke [id number]  [id number]    12/11/1929  80 y.o.  male    Patient Telephone Number: [phone] (home)   Zoroastrianism Affiliation: Unknown   Language: English   Extended Emergency Contact Information  Primary Emergency Contact: Ricky Scott Phone: [phone]  Mobile Phone: [phone]  Relation: Child  Secondary Emergency Contact: Jeff Jackson Joi  Mobile Phone: [phone]  Relation: Daughter   Patient Active Problem List    Diagnosis Date Noted    Cancer [facility]) 04/15/2017    DVT (deep vein thrombosis) in pregnancy ([facility] [address] 04/15/2017    [facility]-fib ([facility] 04/15/2017    HTN (hypertension) 04/15/2017    Pulmonary embolism ([facility] 04/15/2017    BPH (benign prostatic hyperplasia) 04/15/2017        Date: 4/19/2017       Level of Zoroastrianism/Spiritual Activity:  []         Involved in priscilla tradition/spiritual practice    []         Not involved in priscilla tradition/spiritual practice  []         Spiritually oriented    []         Claims no spiritual orientation    []         seeking spiritual identity  []         Feels alienated from Lutheran practice/tradition  []         Feels angry about Lutheran practice/tradition  [x]         Spirituality/Lutheran tradition is not a resource for coping at this time.   []         Not able to assess due to medical condition    Services Provided Today:  []         crisis intervention    []         reading Scriptures  [x]         spiritual assessment    []         prayer  []         empathic listening/emotional support  []         rites and rituals (cite in comments)  []         life review     []         Lutheran support  []         theological development   []         advocacy  []         ethical dialog     []         blessing  []         bereavement support    []         support to family  []         anticipatory grief support   []         help with AMD  []         spiritual guidance    []         meditation      Spiritual Care Needs  []         Emotional Support  []         Spiritual/Muslim Care  []         Loss/Adjustment  []         Advocacy/Referral                /Ethics  [x]         No needs expressed at               this time  []         Other: (note in               comments)  [address]  []         Follow up visits with               pt/family  []         Provide materials  []         Schedule sacraments  []         Contact Community               Clergy  []         Follow up as needed  []         Other: (note in               comments)     Comments: Initial spiritual assessment in Neuro. Patient declined  support at this time. Advised how to access chaplains if desired at a future time. Hina Espino M.S., M.Div.   [address] [facility] ([id number])

## 2017-04-19 NOTE — HOSPICE
Mil  Help to Those in Need  [phone]     Patient Name: Silvana Luke  YOB: 1929  Age: 80 y.o. [address] [facility] RN Note:  Hospice consult received, reviewing chart. Will follow up with Unit Nurse and Care Manager to discuss plan of care, patient status and discharge disposition within the hour. Call made to [facility], dtr. No answer. Will make visit to room. Thank you for the opportunity to be of service to this patient.     Anthony Sainz, RN

## 2017-04-19 NOTE — PROGRESS NOTES
NUTRITION brief         Plans for possible d/c home with Hospice noted. Handouts regarding ways to optimize PO intake as tolerated provided to daughter. Not interested in full education but aware RD available if needed later in stay. Will sign off for now with plans for comfort vs Hospice.      Jag Bañuelos, EB

## 2017-04-19 NOTE — PROGRESS NOTES
Pt's daughter stated that pt lives a 1.5 hrs away and may need a different hospice if [id number] [facility] does not service area, but she will meet with [address] [facility] [employer] [facility] initially. Care Management Interventions  PCP Verified by CM: Yes  Palliative Care Consult (Criteria: CHF and RRAT>21): No  Transition of Care Consult (CM Consult): Home Hospice  MyChart Signup: No  Discharge Durable Medical Equipment: No  Physical Therapy Consult: No  Occupational Therapy Consult: No  Speech Therapy Consult: No  Current Support Network: Lives Alone  Confirm Follow Up Transport: Family  Plan discussed with Pt/Family/Caregiver: Yes  Freedom of Choice Offered:  Yes      Yani Saxena RN  ACM CRM

## 2017-04-19 NOTE — HOSPICE
Mil  Help to Those in Need  [phone]    Patient Name: Vandana Carter  YOB: 1929  Age: 80 y.o. 190 [facility] MSW Note:  Hospice consult noted, Chart reviewed, Plan of care reviewed with patients nurse. .     Patient lives in the [facility], outside our service area. [facility] ( [phone]) to serve patient. Daughter Lei Sim( [phone]: [phone]) will be moving in to patients home to care for him. Daughter is an ER nurse, she is also the MPOA. Daughter reports there are several siblings that will be involved. Our intake office sent clinical information to them. [facility] hospice can accept patient, MSW spoke to them and they will call daughter to arrange time for home admission. Daughter will drive patient home, she has portable O2. Hospice information provided. DDNR completed and is on hard chart awaiting MD signature. DME needed;  O2, portable O2 (E tanks), and OBT. Patient has some medical equipment in the home and may need a hospital bed in the future. Daughter Governor Aguilar ( [phone]) will be able to receive medical equipment in the home tomorrow. [facility] will contact Lei Sim to arrange an ddmission time for 4/20/2017. Thank you for the opportunity to be of service to this patient.     [address] [place] [address]  [phone]

## 2017-04-20 VITALS
RESPIRATION RATE: 20 BRPM | SYSTOLIC BLOOD PRESSURE: 111 MMHG | BODY MASS INDEX: 30.38 KG/M2 | TEMPERATURE: 98.6 F | OXYGEN SATURATION: 99 % | DIASTOLIC BLOOD PRESSURE: 65 MMHG | HEIGHT: 65 IN | WEIGHT: 182.32 LBS | HEART RATE: 108 BPM

## 2017-04-20 PROBLEM — O22.30 DVT (DEEP VEIN THROMBOSIS) IN PREGNANCY: Chronic | Status: RESOLVED | Noted: 2017-04-15 | Resolved: 2017-04-20

## 2017-04-20 PROBLEM — C80.1 CANCER (HCC): Status: RESOLVED | Noted: 2017-04-15 | Resolved: 2017-04-20

## 2017-04-20 PROBLEM — I10 HTN (HYPERTENSION): Chronic | Status: RESOLVED | Noted: 2017-04-15 | Resolved: 2017-04-20

## 2017-04-20 PROBLEM — I26.99 PULMONARY EMBOLISM (HCC): Chronic | Status: RESOLVED | Noted: 2017-04-15 | Resolved: 2017-04-20

## 2017-04-20 PROBLEM — I48.91 A-FIB (HCC): Chronic | Status: RESOLVED | Noted: 2017-04-15 | Resolved: 2017-04-20

## 2017-04-20 PROBLEM — N40.0 BPH (BENIGN PROSTATIC HYPERPLASIA): Chronic | Status: RESOLVED | Noted: 2017-04-15 | Resolved: 2017-04-20

## 2017-04-20 LAB
ANION GAP BLD CALC-SCNC: 10 MMOL/L (ref 5–15)
BUN SERPL-MCNC: 28 MG/DL (ref 6–20)
BUN/CREAT SERPL: 34 (ref 12–20)
CALCIUM SERPL-MCNC: 8.8 MG/DL (ref 8.5–10.1)
CHLORIDE SERPL-SCNC: 106 MMOL/L (ref 97–108)
CO2 SERPL-SCNC: 22 MMOL/L (ref 21–32)
CREAT SERPL-MCNC: 0.83 MG/DL (ref 0.7–1.3)
GLUCOSE SERPL-MCNC: 90 MG/DL (ref 65–100)
POTASSIUM SERPL-SCNC: 4.5 MMOL/L (ref 3.5–5.1)
SODIUM SERPL-SCNC: 138 MMOL/L (ref 136–145)

## 2017-04-20 PROCEDURE — 74011250636 HC RX REV CODE- 250/636: Performed by: HOSPITALIST

## 2017-04-20 PROCEDURE — 94640 AIRWAY INHALATION TREATMENT: CPT

## 2017-04-20 PROCEDURE — 36415 COLL VENOUS BLD VENIPUNCTURE: CPT | Performed by: HOSPITALIST

## 2017-04-20 PROCEDURE — 74011250637 HC RX REV CODE- 250/637: Performed by: INTERNAL MEDICINE

## 2017-04-20 PROCEDURE — 51798 US URINE CAPACITY MEASURE: CPT

## 2017-04-20 PROCEDURE — 80048 BASIC METABOLIC PNL TOTAL CA: CPT | Performed by: HOSPITALIST

## 2017-04-20 PROCEDURE — 74011000250 HC RX REV CODE- 250: Performed by: INTERNAL MEDICINE

## 2017-04-20 PROCEDURE — 74011000250 HC RX REV CODE- 250: Performed by: HOSPITALIST

## 2017-04-20 RX ADMIN — Medication 10 ML: at 06:17

## 2017-04-20 RX ADMIN — ALBUTEROL SULFATE 2.5 MG: 2.5 SOLUTION RESPIRATORY (INHALATION) at 03:58

## 2017-04-20 RX ADMIN — DOXAZOSIN 4 MG: 2 TABLET ORAL at 07:56

## 2017-04-20 RX ADMIN — BUDESONIDE 500 MCG: 0.5 INHALANT RESPIRATORY (INHALATION) at 08:05

## 2017-04-20 RX ADMIN — VITAMIN D, TAB 1000IU (100/BT) 1000 UNITS: 25 TAB at 07:58

## 2017-04-20 RX ADMIN — Medication 3 CAPSULE: at 07:57

## 2017-04-20 RX ADMIN — FOLIC ACID 1 MG: 1 TABLET ORAL at 07:57

## 2017-04-20 RX ADMIN — SODIUM CHLORIDE 75 ML/HR: 900 INJECTION, SOLUTION INTRAVENOUS at 02:55

## 2017-04-20 RX ADMIN — FINASTERIDE 5 MG: 5 TABLET, FILM COATED ORAL at 07:58

## 2017-04-20 RX ADMIN — PANTOPRAZOLE SODIUM 40 MG: 40 TABLET, DELAYED RELEASE ORAL at 06:23

## 2017-04-20 RX ADMIN — GUAIFENESIN 600 MG: 600 TABLET, EXTENDED RELEASE ORAL at 08:00

## 2017-04-20 RX ADMIN — FERROUS SULFATE TAB 325 MG (65 MG ELEMENTAL FE) 325 MG: 325 (65 FE) TAB at 06:23

## 2017-04-20 RX ADMIN — MULTIPLE VITAMINS W/ MINERALS TAB 1 TABLET: TAB at 07:58

## 2017-04-20 RX ADMIN — METOPROLOL TARTRATE 50 MG: 50 TABLET ORAL at 07:59

## 2017-04-20 RX ADMIN — ARFORMOTEROL TARTRATE 15 MCG: 15 SOLUTION RESPIRATORY (INHALATION) at 08:05

## 2017-04-20 NOTE — PROGRESS NOTES
Problem: Falls - Risk of  Goal: *Absence of falls  Outcome: Progressing Towards Goal  Remains free from falls  Goal: *Knowledge of fall prevention  Outcome: Progressing Towards Goal  Demonstrates understanding by calling out for assistance before ambulating    Problem: General Medical Care Plan  Goal: *Optimal pain control at patients stated goal  Outcome: Progressing Towards Goal  Controlled at or below pt stated goal    Problem: Pressure Ulcer - Risk of  Goal: *Prevention of pressure ulcer  Outcome: Progressing Towards Goal  Makes frequent position changes while in bed with encouragement

## 2017-04-20 NOTE — PROGRESS NOTES
Bedside and Verbal shift change report given to [facility], [facility] (oncoming nurse) by Alexandra Carrillo (offgoing nurse). Report included the following information SBAR, Kardex, ED Summary, Procedure Summary, Intake/Output, MAR, Recent Results and Cardiac Rhythm A fib.

## 2017-04-20 NOTE — DISCHARGE INSTRUCTIONS
Discharge Instructions       PATIENT ID: Vianey Mccloud  MRN: [id number]   YOB: 1929    DATE OF ADMISSION: 4/15/2017  3:59 PM    DATE OF DISCHARGE: 4/20/2017    PRIMARY CARE PROVIDER: Myrick Cogan, MD     ATTENDING PHYSICIAN: Nisa Wilson MD  DISCHARGING PROVIDER: Nisa Wilson MD    To contact this individual call [phone] and ask the  to page. If unavailable ask to be transferred the Adult Hospitalist Department. DISCHARGE DIAGNOSES   Metastatic adenocarcinoma,  Urinary retention    CONSULTATIONS: IP CONSULT TO INTERVENTIONAL RADIOLOGY  IP CONSULT TO ONCOLOGY    PROCEDURES/SURGERIES: * No surgery found *    PENDING TEST RESULTS:   At the time of discharge the following test results are still pending: none    FOLLOW UP APPOINTMENTS:   Follow-up Information     Follow up With Details Comments [id number] Misti Elizabeth MD   [address] [facility]  [place] [phone]      hospice services              ADDITIONAL CARE RECOMMENDATIONS:     · Since you failed to void after the goff catheter was removed yesterday,it was reinserted. The goff catheter can be changed every 30 days. If the retention does not [facility] consult with urology. · If your breathing gets worse you become more short of breath,talk to primary doctor or hospice team to check ultrasound to check for ascites(excessive fluid in the abdomen) which can be drained and would help with the breathing. DIET: Cardiac Diet    ACTIVITY: Activity as tolerated    WOUND CARE: NA    EQUIPMENT needed: NA      DISCHARGE MEDICATIONS:   See Medication Reconciliation Form    · It is important that you take the medication exactly as they are prescribed. · Keep your medication in the bottles provided by the pharmacist and keep a list of the medication names, dosages, and times to be taken in your wallet. · Do not take other medications without consulting your doctor.        NOTIFY YOUR PHYSICIAN FOR ANY OF THE FOLLOWING:   Fever over 101 degrees for 24 hours. Chest pain, shortness of breath, fever, chills, nausea, vomiting, diarrhea, change in mentation, falling, weakness, bleeding. Severe pain or pain not relieved by medications. Or, any other signs or symptoms that you may have questions about. DISPOSITION:  x  Home With:   OT  PT  HH  RN       SNF/Inpatient Rehab/LTAC    Independent/assisted living    Hospice    Other:         Signed:    Carolina Fry MD  4/20/2017  11:44 AM

## 2017-04-20 NOTE — HOSPICE
Referral to [facility] hospcie. MSW coordinated home admission for patient with [facility]. DME to be delivered by 1, patient will be admitted between 1 and 2. MD will write dc order and rxs. Daughter will take patient home in her car. MSW has informed CM of the above. This MSW is signing off. Thank-you for this referral and the opportunity to be involved in this patients care.     [address] [place] [address]  [phone]

## 2017-04-20 NOTE — PROGRESS NOTES
I have reviewed discharge instructions with the patient. The patient verbalized understanding. Volunteer services requested.

## 2017-04-20 NOTE — DISCHARGE SUMMARY
Discharge Summary       PATIENT ID: Hamlet Nails  MRN: [id number]   YOB: 1929    DATE OF ADMISSION: 4/15/2017  3:59 PM    DATE OF DISCHARGE: 4/20/2017  PRIMARY CARE PROVIDER: Leroy Garzon MD     ATTENDING PHYSICIAN: Sue Hillman MD  DISCHARGING PROVIDER: Sue Hillman MD    To contact this individual call [phone] and ask the  to page. If unavailable ask to be transferred the Adult Hospitalist Department. CONSULTATIONS: IP CONSULT TO INTERVENTIONAL RADIOLOGY  IP CONSULT TO ONCOLOGY    PROCEDURES/SURGERIES: * No surgery found *    [address] COURSE:      Admission Summary:   80year old male with history of prior DVT/PE, RA, HTN, BPH, pAfib presented on 4/15/17, transferred from [place] due to abnormal findings on CT Abd/Pelvis. He had initially presented on [place] ED due to progressive abdominal distention and dyspnea x 5-6 weeks. He was found to have numerous large solid masses scattered throughout his liver, suspicious for hepatic metastatic disease. Interval history / Subjective:      Feels okay. Would like to go home     Assessment & Plan:           1. 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   - CT chest 4/15 - No evidence of pulmonary nodule or mass. Innumerable large hepatic masses, compatible with metastatic disease. Diffuse peritoneal carcinomatosis. Diffuse gastric wall thickening may represent peritoneal implants or primary gastric malignancy. Small ascites. Small hiatal hernia. Mild centrilobular emphysema.   - s/p need biopsy, 4/17,+for adenoarcinoma  -Patient and daughter would not want any aggressive measures, oncology suggested hospice . Hospice consulted. Discharged home with home hospice.      2.  Dyspnea,multifactorial largely due to abdominal distension,was wheezing as well   - COPD,abdominal distension  -Start steroids used as he was wheezing  -abdominal US showed significant ascites  -Therapeutic paracentesis requested,radiologist called and reported that there is not enough fluid to drain. 3. pAFib   - rate controlled on home metoprolol   - on xarelto     4. History of DVT/PE   - s/p IVC filter, on Xarelto,resumed  -Right LE doppler + for acute DVT     5. BPH   - continue home finasteride, doxazosin   -Voiding trial     6. RA   - resume home methotrexate, folic acid     7. HTN   - he is borderline low-normal BPs   - continue metoprolol   - hold losartan, spironolactone     8. GERD   - on Protonix     9. Leukocytosis   - suspect this is secondary to underlying malignancy / dehydration  -Worsened with steroids. 10.Mild hyperkalemia: kayexalate           Code status: DNR  On [address] discussed with: discussed with patient and daughter who is an RN. Disposition: home with hospice services. DISCHARGE DIAGNOSES / PLAN:      See above       PENDING TEST RESULTS:   At the time of discharge the following test results are still pending: none    FOLLOW UP APPOINTMENTS:    Follow-up Information     Follow up With Details Comments [address], [place]   [address] [address]  [phone]      hospice services              ADDITIONAL CARE RECOMMENDATIONS:     DIET: Cardiac Diet    ACTIVITY: Activity as tolerated    WOUND CARE: NA    EQUIPMENT needed: NA      DISCHARGE MEDICATIONS:  Current Discharge Medication List      CONTINUE these medications which have NOT CHANGED    Details   multivitamin, tx-iron-ca-min (THERA-M W/ IRON) 9 mg iron-400 mcg tab tablet Take 1 Tab by mouth daily. doxazosin (CARDURA) 4 mg tablet Take 4 mg by mouth daily. finasteride (PROSCAR) 5 mg tablet Take 5 mg by mouth daily. fluticasone-salmeterol (ADVAIR DISKUS) 500-50 mcg/dose diskus inhaler Take 1 Puff by inhalation every twelve (12) hours.       guaiFENesin SR (MUCINEX) 600 mg SR tablet Take 600 mg by mouth two (2) times a day. hydroCHLOROthiazide (HYDRODIURIL) 25 mg tablet Take 25 mg by mouth daily. ferrous sulfate 325 mg (65 mg iron) tablet Take 325 mg by mouth Daily (before breakfast). losartan (COZAAR) 50 mg tablet Take 50 mg by mouth daily. methotrexate (RHEUMATREX) 2.5 mg tablet Take 10 mg by mouth every Monday. metoprolol tartrate (LOPRESSOR) 50 mg tablet Take 50 mg by mouth two (2) times a day. fish oil-omega-3 fatty acids 340-1,000 mg capsule Take 3 Caps by mouth daily. omeprazole (PRILOSEC) 20 mg capsule Take 20 mg by mouth daily. folic acid (FOLVITE) 1 mg tablet Take 1 mg by mouth daily. spironolactone (ALDACTONE) 100 mg tablet Take 100 mg by mouth daily. cholecalciferol (VITAMIN D3) 1,000 unit tablet Take 1,000 Units by mouth daily. rivaroxaban (XARELTO) 20 mg tab tablet Take 20 mg by mouth daily (with dinner). albuterol-ipratropium (DUONEB) 2.5 mg-0.5 mg/3 ml nebu 3 mL by Nebulization route every four (4) hours as needed. NOTIFY YOUR PHYSICIAN FOR ANY OF THE FOLLOWING:   Fever over 101 degrees for 24 hours. Chest pain, shortness of breath, fever, chills, nausea, vomiting, diarrhea, change in mentation, falling, weakness, bleeding. Severe pain or pain not relieved by medications. Or, any other signs or symptoms that you may have questions about.     DISPOSITION:    Home With:   OT  PT  HH  RN       Long term SNF/Inpatient Rehab    Independent/assisted living   x Hospice,at home    Other:       PATIENT CONDITION AT DISCHARGE:     Functional status    Poor    x Deconditioned     Independent      Cognition    x Lucid     Forgetful     Dementia      Catheters/lines (plus indication)   x Teague     PICC     PEG    x None      Code status     Full code    x DNR      PHYSICAL EXAMINATION AT DISCHARGE:     Visit Vitals    /65 (BP 1 Location: Left arm, BP Patient Position: At rest)    Pulse (!) 108    Temp 98.6 °F (37 °C)    Resp 20    Ht 5' 5\" (1.651 m)    Wt 82.7 kg (182 lb 5.1 oz)    SpO2 99%    BMI 30.34 kg/m2    O2 Flow Rate (L/min): 2 l/min O2 Device: Nasal cannula    Temp (24hrs), Av.4 °F (36.9 °C), Min:97.9 °F (36.6 °C), Max:98.6 °F (37 °C)         1901 -  0700  In: 2100 [I.V.:2100]  Out: 3894 [Urine:2850]    Constitutional:  No acute distress, cooperative, pleasant    ENT:  Oral mucous moist, oropharynx benign. Neck supple,    Resp:  Diffuse wheezing    CV:  Regular rhythm, normal rate, no murmurs, gallops, rubs    GI:  Soft, distended with nodularity in RUQ, non tender. normoactive bowel sounds, hepatomegaly    Musculoskeletal:  Bilateral asymmetric lower extremity edema, at least 1+ pitting R > L, warm, 2+ pulses throughout    Neurologic:  Moves all extremities. AAOx3, CN II-XII reviewed                                             Skin:  overlying chronic brawny skin changes in bilateral lower extremities, more prominent on R compared to L          CHRONIC MEDICAL DIAGNOSES:  Problem List as of 2017  Date Reviewed: 4/15/2017          Codes Class Noted - Resolved    * (Principal)RESOLVED: Cancer ([facility] ICD-10-CM: C80.1  ICD-9-CM: 199.1  4/15/2017 - 2017        RESOLVED: DVT (deep vein thrombosis) in pregnancy ([facility] (Chronic) ICD-10-CM: O22.30, I82.409  ICD-9-CM: 671.30  4/15/2017 - 2017        RESOLVED: A-fib ([facility] (Chronic) ICD-10-CM: I48.91  ICD-9-CM: 427.31  4/15/2017 - 2017        RESOLVED: HTN (hypertension) (Chronic) ICD-10-CM: I10  ICD-9-CM: 401.9  4/15/2017 - 2017        RESOLVED: Pulmonary embolism ([facility]) (Chronic) ICD-10-CM: I26.99  ICD-9-CM: 415.19  4/15/2017 - 2017        RESOLVED: BPH (benign prostatic hyperplasia) (Chronic) ICD-10-CM: N40.0  ICD-9-CM: 600.00  4/15/2017 - 2017              Greater than 30 minutes were spent with the patient on counseling and coordination of care    Signed:    Fanta May MD  2017  11:47 AM

## 2022-12-15 NOTE — ROUTINE PROCESS
Primary Nurse Patricia Victor RN and Gene Umana RN performed a dual skin assessment on this patient Impairment noted- see wound doc flow sheet scab right shin    Mario score is 19 4 = No assist / stand by assistance